# Patient Record
Sex: FEMALE | Race: WHITE | Employment: OTHER | URBAN - METROPOLITAN AREA
[De-identification: names, ages, dates, MRNs, and addresses within clinical notes are randomized per-mention and may not be internally consistent; named-entity substitution may affect disease eponyms.]

---

## 2017-03-10 ENCOUNTER — OFFICE VISIT (OUTPATIENT)
Dept: FAMILY MEDICINE | Facility: CLINIC | Age: 57
End: 2017-03-10
Payer: COMMERCIAL

## 2017-03-10 VITALS
HEART RATE: 78 BPM | SYSTOLIC BLOOD PRESSURE: 118 MMHG | OXYGEN SATURATION: 100 % | TEMPERATURE: 97.5 F | HEIGHT: 64 IN | DIASTOLIC BLOOD PRESSURE: 86 MMHG | BODY MASS INDEX: 22.62 KG/M2 | WEIGHT: 132.5 LBS

## 2017-03-10 DIAGNOSIS — R42 DIZZINESS: Primary | ICD-10-CM

## 2017-03-10 DIAGNOSIS — R53.83 OTHER FATIGUE: ICD-10-CM

## 2017-03-10 DIAGNOSIS — C91.00: ICD-10-CM

## 2017-03-10 DIAGNOSIS — Z87.828 HISTORY OF BICYCLE ACCIDENT: ICD-10-CM

## 2017-03-10 DIAGNOSIS — Z94.81 S/P AUTOLOGOUS BONE MARROW TRANSPLANTATION (H): ICD-10-CM

## 2017-03-10 DIAGNOSIS — G47.00 INSOMNIA, UNSPECIFIED TYPE: ICD-10-CM

## 2017-03-10 LAB
ALBUMIN SERPL-MCNC: 4.3 G/DL (ref 3.4–5)
ALBUMIN UR-MCNC: NEGATIVE MG/DL
ALP SERPL-CCNC: 55 U/L (ref 40–150)
ALT SERPL W P-5'-P-CCNC: 29 U/L (ref 0–50)
ANION GAP SERPL CALCULATED.3IONS-SCNC: 4 MMOL/L (ref 3–14)
APPEARANCE UR: CLEAR
AST SERPL W P-5'-P-CCNC: 21 U/L (ref 0–45)
BASOPHILS # BLD AUTO: 0 10E9/L (ref 0–0.2)
BASOPHILS NFR BLD AUTO: 0.3 %
BILIRUB SERPL-MCNC: 0.3 MG/DL (ref 0.2–1.3)
BILIRUB UR QL STRIP: NEGATIVE
BUN SERPL-MCNC: 11 MG/DL (ref 7–30)
CALCIUM SERPL-MCNC: 9.2 MG/DL (ref 8.5–10.1)
CHLORIDE SERPL-SCNC: 104 MMOL/L (ref 94–109)
CO2 SERPL-SCNC: 31 MMOL/L (ref 20–32)
COLOR UR AUTO: YELLOW
CREAT SERPL-MCNC: 0.76 MG/DL (ref 0.52–1.04)
DIFFERENTIAL METHOD BLD: NORMAL
EOSINOPHIL # BLD AUTO: 0.1 10E9/L (ref 0–0.7)
EOSINOPHIL NFR BLD AUTO: 0.9 %
ERYTHROCYTE [DISTWIDTH] IN BLOOD BY AUTOMATED COUNT: 12.7 % (ref 10–15)
GFR SERPL CREATININE-BSD FRML MDRD: 78 ML/MIN/1.7M2
GLUCOSE SERPL-MCNC: 107 MG/DL (ref 70–99)
GLUCOSE UR STRIP-MCNC: NEGATIVE MG/DL
HCT VFR BLD AUTO: 41.3 % (ref 35–47)
HGB BLD-MCNC: 14.4 G/DL (ref 11.7–15.7)
HGB UR QL STRIP: NEGATIVE
KETONES UR STRIP-MCNC: NEGATIVE MG/DL
LDH SERPL L TO P-CCNC: 204 U/L (ref 81–234)
LEUKOCYTE ESTERASE UR QL STRIP: NEGATIVE
LYMPHOCYTES # BLD AUTO: 1.1 10E9/L (ref 0.8–5.3)
LYMPHOCYTES NFR BLD AUTO: 16 %
MCH RBC QN AUTO: 32.2 PG (ref 26.5–33)
MCHC RBC AUTO-ENTMCNC: 34.9 G/DL (ref 31.5–36.5)
MCV RBC AUTO: 92 FL (ref 78–100)
MONOCYTES # BLD AUTO: 0.7 10E9/L (ref 0–1.3)
MONOCYTES NFR BLD AUTO: 9.7 %
NEUTROPHILS # BLD AUTO: 5.1 10E9/L (ref 1.6–8.3)
NEUTROPHILS NFR BLD AUTO: 73.1 %
NITRATE UR QL: NEGATIVE
PH UR STRIP: 7 PH (ref 5–7)
PLATELET # BLD AUTO: 158 10E9/L (ref 150–450)
POTASSIUM SERPL-SCNC: 4.7 MMOL/L (ref 3.4–5.3)
PROT SERPL-MCNC: 6.4 G/DL (ref 6.8–8.8)
RBC # BLD AUTO: 4.47 10E12/L (ref 3.8–5.2)
SODIUM SERPL-SCNC: 139 MMOL/L (ref 133–144)
SP GR UR STRIP: 1.01 (ref 1–1.03)
TSH SERPL DL<=0.005 MIU/L-ACNC: 0.64 MU/L (ref 0.4–4)
URN SPEC COLLECT METH UR: NORMAL
UROBILINOGEN UR STRIP-ACNC: 0.2 EU/DL (ref 0.2–1)
WBC # BLD AUTO: 6.9 10E9/L (ref 4–11)

## 2017-03-10 PROCEDURE — 80050 GENERAL HEALTH PANEL: CPT | Performed by: FAMILY MEDICINE

## 2017-03-10 PROCEDURE — 83615 LACTATE (LD) (LDH) ENZYME: CPT | Performed by: FAMILY MEDICINE

## 2017-03-10 PROCEDURE — 99214 OFFICE O/P EST MOD 30 MIN: CPT | Performed by: FAMILY MEDICINE

## 2017-03-10 PROCEDURE — 81003 URINALYSIS AUTO W/O SCOPE: CPT | Performed by: FAMILY MEDICINE

## 2017-03-10 PROCEDURE — 36415 COLL VENOUS BLD VENIPUNCTURE: CPT | Performed by: FAMILY MEDICINE

## 2017-03-10 RX ORDER — CYCLOBENZAPRINE HCL 10 MG
10 TABLET ORAL
Qty: 30 TABLET | Refills: 5 | Status: SHIPPED | OUTPATIENT
Start: 2017-03-10 | End: 2018-04-12

## 2017-03-10 RX ORDER — TRAZODONE HYDROCHLORIDE 50 MG/1
50 TABLET, FILM COATED ORAL
Qty: 30 TABLET | Refills: 1 | Status: SHIPPED | OUTPATIENT
Start: 2017-03-10 | End: 2017-06-08

## 2017-03-10 RX ORDER — FLUTICASONE PROPIONATE 50 MCG
1-2 SPRAY, SUSPENSION (ML) NASAL DAILY
Qty: 1 BOTTLE | Refills: 11 | Status: SHIPPED | OUTPATIENT
Start: 2017-03-10 | End: 2017-06-08

## 2017-03-10 ASSESSMENT — PAIN SCALES - GENERAL: PAINLEVEL: NO PAIN (0)
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
## 2017-03-10 NOTE — PATIENT INSTRUCTIONS
Start Flonase.     If dizziness persists or headaches worsen, follow up.     Follow up with oncology.     Take 1 tablet (50mg) of Trazodone nightly as needed for sleep.

## 2017-03-10 NOTE — NURSING NOTE
"Chief Complaint   Patient presents with     Fatigue     Dizziness     very short episodes       Initial /86 (BP Location: Right arm, Patient Position: Chair, Cuff Size: Adult Regular)  Pulse 78  Temp 97.5  F (36.4  C) (Oral)  Ht 1.638 m (5' 4.49\")  Wt 60.1 kg (132 lb 8 oz)  SpO2 100%  Breastfeeding? No  BMI 22.4 kg/m2 Estimated body mass index is 22.4 kg/(m^2) as calculated from the following:    Height as of this encounter: 1.638 m (5' 4.49\").    Weight as of this encounter: 60.1 kg (132 lb 8 oz).  Medication Reconciliation: complete     ALISSA Martinez MA      "

## 2017-06-08 ENCOUNTER — ONCOLOGY VISIT (OUTPATIENT)
Dept: ONCOLOGY | Facility: CLINIC | Age: 57
End: 2017-06-08
Attending: INTERNAL MEDICINE
Payer: COMMERCIAL

## 2017-06-08 ENCOUNTER — APPOINTMENT (OUTPATIENT)
Dept: LAB | Facility: CLINIC | Age: 57
End: 2017-06-08
Attending: INTERNAL MEDICINE
Payer: COMMERCIAL

## 2017-06-08 VITALS
OXYGEN SATURATION: 99 % | SYSTOLIC BLOOD PRESSURE: 125 MMHG | BODY MASS INDEX: 22.06 KG/M2 | TEMPERATURE: 96.6 F | WEIGHT: 130.51 LBS | RESPIRATION RATE: 18 BRPM | DIASTOLIC BLOOD PRESSURE: 84 MMHG | HEART RATE: 77 BPM

## 2017-06-08 DIAGNOSIS — C85.80 LYMPHOMA MALIGNANT, LARGE CELL (H): ICD-10-CM

## 2017-06-08 LAB
BASOPHILS # BLD AUTO: 0 10E9/L (ref 0–0.2)
BASOPHILS NFR BLD AUTO: 0.4 %
DIFFERENTIAL METHOD BLD: NORMAL
EOSINOPHIL # BLD AUTO: 0.1 10E9/L (ref 0–0.7)
EOSINOPHIL NFR BLD AUTO: 1.2 %
ERYTHROCYTE [DISTWIDTH] IN BLOOD BY AUTOMATED COUNT: 12.5 % (ref 10–15)
HCT VFR BLD AUTO: 40.7 % (ref 35–47)
HGB BLD-MCNC: 14.1 G/DL (ref 11.7–15.7)
IMM GRANULOCYTES # BLD: 0 10E9/L (ref 0–0.4)
IMM GRANULOCYTES NFR BLD: 0.2 %
LYMPHOCYTES # BLD AUTO: 0.9 10E9/L (ref 0.8–5.3)
LYMPHOCYTES NFR BLD AUTO: 18.6 %
MCH RBC QN AUTO: 32.6 PG (ref 26.5–33)
MCHC RBC AUTO-ENTMCNC: 34.6 G/DL (ref 31.5–36.5)
MCV RBC AUTO: 94 FL (ref 78–100)
MONOCYTES # BLD AUTO: 0.5 10E9/L (ref 0–1.3)
MONOCYTES NFR BLD AUTO: 10.8 %
NEUTROPHILS # BLD AUTO: 3.4 10E9/L (ref 1.6–8.3)
NEUTROPHILS NFR BLD AUTO: 68.8 %
NRBC # BLD AUTO: 0 10*3/UL
NRBC BLD AUTO-RTO: 0 /100
PLATELET # BLD AUTO: 165 10E9/L (ref 150–450)
RBC # BLD AUTO: 4.32 10E12/L (ref 3.8–5.2)
WBC # BLD AUTO: 4.9 10E9/L (ref 4–11)

## 2017-06-08 PROCEDURE — 85025 COMPLETE CBC W/AUTO DIFF WBC: CPT | Performed by: INTERNAL MEDICINE

## 2017-06-08 PROCEDURE — 99214 OFFICE O/P EST MOD 30 MIN: CPT | Mod: ZP | Performed by: INTERNAL MEDICINE

## 2017-06-08 PROCEDURE — 36415 COLL VENOUS BLD VENIPUNCTURE: CPT | Performed by: INTERNAL MEDICINE

## 2017-06-08 PROCEDURE — 99212 OFFICE O/P EST SF 10 MIN: CPT | Mod: ZF

## 2017-06-08 NOTE — MR AVS SNAPSHOT
After Visit Summary   6/8/2017    Nancy Catalan    MRN: [id number]           Patient Information     Date Of Birth          1960        Visit Information        Provider Department      6/8/2017 9:15 AM Yesenia Horvath MD [facility]        Today's Diagnoses     Lymphoma malignant, large cell (H)           Follow-ups after your visit        Who to contact     If you have questions or need follow up information about today's clinic visit or your schedule please contact [facility] directly at [phone].  Normal or non-critical lab and imaging results will be communicated to you by [employer], letter or phone within 4 business days after the clinic has received the results. If you do not hear from us within 7 days, please contact the clinic through [employer] or phone. If you have a critical or abnormal lab result, we will notify you by phone as soon as possible.  Submit refill requests through [employer] or call your pharmacy and they will forward the refill request to us. Please allow 3 business days for your refill to be completed.          Additional Information About Your Visit        MyChart Information     [employer] gives you secure access to your electronic health record. If you see a primary care provider, you can also send messages to your care team and make appointments. If you have questions, please call your primary care clinic.  If you do not have a primary care provider, please call [phone] and they will assist you.        Care EveryWhere ID     This is your Care EveryWhere ID. This could be used by other organizations to access your [facility] medical records  [id number]        Your Vitals Were     Pulse Temperature Respirations Pulse Oximetry BMI (Body Mass Index)       77 96.6  F (35.9  C) (Oral) 18 99% 22.06 kg/m2        Blood Pressure from Last 3 Encounters:   06/08/17 125/84   03/10/17 118/86   12/29/16 116/75    Weight from Last 3 Encounters:    06/08/17 59.2 kg (130 lb 8.2 oz)   03/10/17 60.1 kg (132 lb 8 oz)   12/29/16 59.8 kg (131 lb 14.4 oz)              We Performed the Following     *CBC with platelets differential        Primary Care Provider    Physician No Ref-Primary       No address on file        Thank you!     Thank you for choosing [facility]  for your care. Our goal is always to provide you with excellent care. Hearing back from our patients is one way we can continue to improve our services. Please take a few minutes to complete the written survey that you may receive in the mail after your visit with us. Thank you!             Your Updated Medication List - Protect others around you: Learn how to safely use, store and throw away your medicines at www.disposemymeds.org.          This list is accurate as of: 6/8/17 10:29 AM.  Always use your most recent med list.                   Brand Name Dispense Instructions for use    acetaminophen 325 MG tablet    TYLENOL    100 tablet    Take 2 tablets (650 mg) by mouth every 4 hours as needed for mild pain or fever       acyclovir 400 MG tablet    ZOVIRAX    180 tablet    Take 1 tablet (400 mg) by mouth 2 times daily       ADVIL 200 MG tablet   Generic drug:  ibuprofen      Take by mouth daily as needed       cholecalciferol 1000 UNITS capsule    vitamin  -D    30 capsule    Take 1 capsule (1,000 Units) by mouth daily       cyclobenzaprine 10 MG tablet    FLEXERIL    30 tablet    Take 1 tablet (10 mg) by mouth nightly as needed for muscle spasms       Multi-vitamin Tabs tablet      Take 1 tablet by mouth daily       OMEGA-3 FISH OIL PO
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
## 2017-06-08 NOTE — NURSING NOTE
Chief Complaint   Patient presents with     Lab Only     peripheral blood draw and vitals   labs drawn from right arm
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
## 2017-06-08 NOTE — NURSING NOTE
"Oncology Rooming Note    June 8, 2017 9:30 AM   Nancy Catalan is a 56 year old female who presents for:    Chief Complaint   Patient presents with     Lab Only     peripheral blood draw and vitals     Oncology Clinic Visit     lymphoma      Initial Vitals: /84  Pulse 77  Temp 96.6  F (35.9  C) (Oral)  Resp 18  Wt 59.2 kg (130 lb 8.2 oz)  SpO2 99%  BMI 22.06 kg/m2 Estimated body mass index is 22.06 kg/(m^2) as calculated from the following:    Height as of 3/10/17: 1.638 m (5' 4.49\").    Weight as of this encounter: 59.2 kg (130 lb 8.2 oz). Body surface area is 1.64 meters squared.  Data Unavailable Comment: Data Unavailable   No LMP recorded. Patient has had an ablation.  Allergies reviewed: Yes  Medications reviewed: Yes    Medications: Medication refills not needed today.  Pharmacy name entered into [employer]:    [id number] [place], [place] - [address] [place] [address] - [phone] [facility] [phone]    Clinical concerns: no doc was NOT notified.    5 minutes for nursing intake (face to face time)     Helen Bennett CMA                "

## 2017-11-16 ENCOUNTER — RADIANT APPOINTMENT (OUTPATIENT)
Dept: MAMMOGRAPHY | Facility: CLINIC | Age: 57
End: 2017-11-16
Attending: FAMILY MEDICINE
Payer: COMMERCIAL

## 2017-11-16 DIAGNOSIS — Z12.39 SCREENING BREAST EXAMINATION: ICD-10-CM

## 2017-11-16 PROCEDURE — 77063 BREAST TOMOSYNTHESIS BI: CPT | Performed by: STUDENT IN AN ORGANIZED HEALTH CARE EDUCATION/TRAINING PROGRAM

## 2017-11-16 PROCEDURE — G0202 SCR MAMMO BI INCL CAD: HCPCS | Performed by: STUDENT IN AN ORGANIZED HEALTH CARE EDUCATION/TRAINING PROGRAM

## 2017-12-07 ENCOUNTER — ONCOLOGY VISIT (OUTPATIENT)
Dept: ONCOLOGY | Facility: CLINIC | Age: 57
End: 2017-12-07
Attending: INTERNAL MEDICINE
Payer: COMMERCIAL

## 2017-12-07 ENCOUNTER — APPOINTMENT (OUTPATIENT)
Dept: LAB | Facility: CLINIC | Age: 57
End: 2017-12-07
Attending: INTERNAL MEDICINE
Payer: COMMERCIAL

## 2017-12-07 VITALS
WEIGHT: 133.5 LBS | TEMPERATURE: 97.4 F | OXYGEN SATURATION: 99 % | DIASTOLIC BLOOD PRESSURE: 79 MMHG | SYSTOLIC BLOOD PRESSURE: 130 MMHG | RESPIRATION RATE: 16 BRPM | HEART RATE: 78 BPM | BODY MASS INDEX: 22.57 KG/M2

## 2017-12-07 DIAGNOSIS — C85.80 LYMPHOMA MALIGNANT, LARGE CELL (H): ICD-10-CM

## 2017-12-07 LAB
BASOPHILS # BLD AUTO: 0 10E9/L (ref 0–0.2)
BASOPHILS NFR BLD AUTO: 0.3 %
DIFFERENTIAL METHOD BLD: ABNORMAL
EOSINOPHIL # BLD AUTO: 0 10E9/L (ref 0–0.7)
EOSINOPHIL NFR BLD AUTO: 0.7 %
ERYTHROCYTE [DISTWIDTH] IN BLOOD BY AUTOMATED COUNT: 12.3 % (ref 10–15)
HCT VFR BLD AUTO: 41.3 % (ref 35–47)
HGB BLD-MCNC: 13.8 G/DL (ref 11.7–15.7)
IMM GRANULOCYTES # BLD: 0 10E9/L (ref 0–0.4)
IMM GRANULOCYTES NFR BLD: 0.3 %
LYMPHOCYTES # BLD AUTO: 1.1 10E9/L (ref 0.8–5.3)
LYMPHOCYTES NFR BLD AUTO: 35.8 %
MCH RBC QN AUTO: 31.2 PG (ref 26.5–33)
MCHC RBC AUTO-ENTMCNC: 33.4 G/DL (ref 31.5–36.5)
MCV RBC AUTO: 93 FL (ref 78–100)
MONOCYTES # BLD AUTO: 0.4 10E9/L (ref 0–1.3)
MONOCYTES NFR BLD AUTO: 13 %
NEUTROPHILS # BLD AUTO: 1.5 10E9/L (ref 1.6–8.3)
NEUTROPHILS NFR BLD AUTO: 49.9 %
NRBC # BLD AUTO: 0 10*3/UL
NRBC BLD AUTO-RTO: 0 /100
PLATELET # BLD AUTO: 145 10E9/L (ref 150–450)
RBC # BLD AUTO: 4.43 10E12/L (ref 3.8–5.2)
WBC # BLD AUTO: 2.9 10E9/L (ref 4–11)

## 2017-12-07 PROCEDURE — 36415 COLL VENOUS BLD VENIPUNCTURE: CPT

## 2017-12-07 PROCEDURE — 85025 COMPLETE CBC W/AUTO DIFF WBC: CPT | Performed by: INTERNAL MEDICINE

## 2017-12-07 PROCEDURE — 99212 OFFICE O/P EST SF 10 MIN: CPT | Mod: ZF

## 2017-12-07 PROCEDURE — 99213 OFFICE O/P EST LOW 20 MIN: CPT | Mod: ZP | Performed by: INTERNAL MEDICINE

## 2017-12-07 ASSESSMENT — PAIN SCALES - GENERAL: PAINLEVEL: NO PAIN (0)
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
## 2017-12-07 NOTE — NURSING NOTE
"Oncology Rooming Note    December 7, 2017 9:13 AM   Nancy Catalan is a 57 year old female who presents for:    Chief Complaint   Patient presents with     Blood Draw     Oncology Clinic Visit     Return for Lymphoma , labs      Initial Vitals: /79  Pulse 78  Temp 97.4  F (36.3  C) (Oral)  Resp 16  Wt 60.6 kg (133 lb 8 oz)  SpO2 99%  BMI 22.57 kg/m2 Estimated body mass index is 22.57 kg/(m^2) as calculated from the following:    Height as of 3/10/17: 1.638 m (5' 4.49\").    Weight as of this encounter: 60.6 kg (133 lb 8 oz). Body surface area is 1.66 meters squared.  No Pain (0) Comment: Data Unavailable   No LMP recorded. Patient has had an ablation.  Allergies reviewed: Yes  Medications reviewed: Yes    Medications: Medication refills not needed today.  Pharmacy name entered into [employer]:    [id number] [place], [place] - [address] - [address] [facility] [phone]    Clinical concerns: results  [facility]  was notified.    7 minutes for nursing intake (face to face time)     Bella Davis MA              "
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
## 2017-12-07 NOTE — NURSING NOTE
Labs completed via  and vitals obtained without complication.    See flowsheets.    Patient was checked into next appt.    June Ray CMA (AAMA)
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
## 2017-12-24 ENCOUNTER — HEALTH MAINTENANCE LETTER (OUTPATIENT)
Age: 57
End: 2017-12-24

## 2018-02-01 ENCOUNTER — OFFICE VISIT (OUTPATIENT)
Dept: DERMATOLOGY | Facility: CLINIC | Age: 58
End: 2018-02-01
Payer: MEDICAID

## 2018-02-01 DIAGNOSIS — Z12.83 SKIN CANCER SCREENING: Primary | ICD-10-CM

## 2018-02-01 DIAGNOSIS — D18.01 CHERRY ANGIOMA: ICD-10-CM

## 2018-02-01 DIAGNOSIS — L82.1 SEBORRHEIC KERATOSIS: ICD-10-CM

## 2018-02-01 DIAGNOSIS — Z80.8 FAMILY HISTORY OF MELANOMA: ICD-10-CM

## 2018-02-01 DIAGNOSIS — Z85.820 HISTORY OF MELANOMA: ICD-10-CM

## 2018-02-01 ASSESSMENT — PAIN SCALES - GENERAL: PAINLEVEL: NO PAIN (0)
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
## 2018-02-01 NOTE — PATIENT INSTRUCTIONS
The ABCDEs of Melanoma    Skin cancer can develop anywhere on the skin. Ask someone for help when checking your skin, especially in hard to see places. If you notice a mole different from others, or that changes, enlarges, itches, or bleeds (even if it is small), you should see a dermatologist.
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
## 2018-02-01 NOTE — NURSING NOTE
Chief Complaint   Patient presents with     Skin Check     Nancy is here today to have a full body exam. She is post 3 month recheck after having a Melanoma on the right shoulder. She had this excised at [facility].      Shahana Philippe CMA

## 2018-04-12 ENCOUNTER — TELEPHONE (OUTPATIENT)
Dept: FAMILY MEDICINE | Facility: CLINIC | Age: 58
End: 2018-04-12

## 2018-04-12 DIAGNOSIS — Z87.828 HISTORY OF BICYCLE ACCIDENT: ICD-10-CM

## 2018-04-12 NOTE — TELEPHONE ENCOUNTER
Reason for Call:  Medication or medication refill:    Do you use a [facility]?  Name of the pharmacy and phone number for the current request:  [facility] [address] [place] [address] [address] AT [facility] [address]    Name of the medication requested: cyclobenzaprine (FLEXERIL) 10 MG tablet    Other request:     Can we leave a detailed message on this number? YES    Phone number patient can be reached at: Cell number on file:    Telephone Information:   Mobile [phone]       Best Time: anytime     Call taken on 4/12/2018 at 4:02 PM by Bernie Parrish

## 2018-04-17 RX ORDER — CYCLOBENZAPRINE HCL 10 MG
10 TABLET ORAL
Qty: 30 TABLET | Refills: 0 | Status: SHIPPED | OUTPATIENT
Start: 2018-04-17 | End: 2018-07-27

## 2018-04-17 NOTE — TELEPHONE ENCOUNTER
flexeril      Last Written Prescription Date:  3/10/17  Last Fill Quantity: 30,   # refills: 5  Last Office Visit: 3/10/17  Future Office visit:       Routing refill request to provider for review/approval because:  Drug not on the [facility], [facility] or [facility] refill protocol or controlled substance

## 2018-05-02 ENCOUNTER — OFFICE VISIT (OUTPATIENT)
Dept: DERMATOLOGY | Facility: CLINIC | Age: 58
End: 2018-05-02
Payer: COMMERCIAL

## 2018-05-02 DIAGNOSIS — L81.4 SOLAR LENTIGINOSIS: ICD-10-CM

## 2018-05-02 DIAGNOSIS — L82.1 SEBORRHEIC KERATOSIS: ICD-10-CM

## 2018-05-02 DIAGNOSIS — Z12.83 SKIN CANCER SCREENING: ICD-10-CM

## 2018-05-02 DIAGNOSIS — Z80.8 FAMILY HISTORY OF MELANOMA: ICD-10-CM

## 2018-05-02 DIAGNOSIS — Z80.9 FAMILY HISTORY OF MALIGNANT NEOPLASM: Primary | ICD-10-CM

## 2018-05-02 ASSESSMENT — PAIN SCALES - GENERAL: PAINLEVEL: NO PAIN (0)
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
## 2018-05-02 NOTE — NURSING NOTE
Chief Complaint   Patient presents with     Derm Problem     Nancy is here today for a total body skin exam. Patient has no concerns.     Michelle Linares LPN

## 2018-05-22 ENCOUNTER — OFFICE VISIT (OUTPATIENT)
Dept: FAMILY MEDICINE | Facility: CLINIC | Age: 58
End: 2018-05-22
Payer: COMMERCIAL

## 2018-05-22 VITALS
DIASTOLIC BLOOD PRESSURE: 72 MMHG | SYSTOLIC BLOOD PRESSURE: 114 MMHG | HEART RATE: 86 BPM | WEIGHT: 134 LBS | BODY MASS INDEX: 22.88 KG/M2 | TEMPERATURE: 98.6 F | RESPIRATION RATE: 17 BRPM | HEIGHT: 64 IN | OXYGEN SATURATION: 100 %

## 2018-05-22 DIAGNOSIS — H66.90 ACUTE OTITIS MEDIA, UNSPECIFIED OTITIS MEDIA TYPE: Primary | ICD-10-CM

## 2018-05-22 PROCEDURE — 99213 OFFICE O/P EST LOW 20 MIN: CPT | Performed by: PHYSICIAN ASSISTANT

## 2018-05-22 ASSESSMENT — PAIN SCALES - GENERAL: PAINLEVEL: MILD PAIN (3)
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
## 2018-05-22 NOTE — PATIENT INSTRUCTIONS
Take augmentin twice a day for 10 days  Follow up with ENT if no improvement in symptoms   Return urgently if any change in symptoms like fever, increasing cough, shortness of breath, or other change in symptoms.     At [facility], we strive to deliver an exceptional experience to you, every time we see you.  If you receive a survey in the mail, please send us back your thoughts. We really do value your feedback.      Suggested websites for health information:  Www.[facility].org : Up to date and easily searchable information on multiple topics.  Www.medlineplus.gov : medication info, interactive tutorials, watch real surgeries online  Www.familydoctor.org : good info from the Academy of Family Physicians  Www.cdc.gov : public health info, travel advisories, epidemics (H1N1)  Www.aap.org : children's health info, normal development, vaccinations  Www.health.state.[place].us : [place] dept of health, public health issues in [place], N1N1    Your care team:     Family Medicine   PURVI Fonseca MD Emily Bunt, APRN [facility]   S. MD Cintia Clark MD Angela Wermerskirchen, MD         Clinic hours: Monday - Wednesday 7 am-7 pm   Thursdays and Fridays 7 am-5 pm.     [facility]: Monday - Friday 11 am-9 pm,   Saturday and Sunday 9 am-5 pm.    [facility]: Monday -Thursday 8 am-8 pm; Friday 8 am-6 pm; Saturday and Sunday 9 am-5 pm.     [facility]: Monday - Thursday 8 am - 7 pm; Friday 8 am - 6 pm    Clinic: [phone]   [facility]: [phone]   [facility]: [phone]

## 2018-06-07 ENCOUNTER — ONCOLOGY VISIT (OUTPATIENT)
Dept: ONCOLOGY | Facility: CLINIC | Age: 58
End: 2018-06-07
Attending: INTERNAL MEDICINE
Payer: COMMERCIAL

## 2018-06-07 ENCOUNTER — APPOINTMENT (OUTPATIENT)
Dept: LAB | Facility: CLINIC | Age: 58
End: 2018-06-07
Attending: INTERNAL MEDICINE
Payer: COMMERCIAL

## 2018-06-07 VITALS
OXYGEN SATURATION: 98 % | HEART RATE: 79 BPM | SYSTOLIC BLOOD PRESSURE: 139 MMHG | BODY MASS INDEX: 22.81 KG/M2 | RESPIRATION RATE: 16 BRPM | WEIGHT: 133.6 LBS | DIASTOLIC BLOOD PRESSURE: 83 MMHG | TEMPERATURE: 98 F | HEIGHT: 64 IN

## 2018-06-07 DIAGNOSIS — C85.80 LYMPHOMA MALIGNANT, LARGE CELL (H): ICD-10-CM

## 2018-06-07 DIAGNOSIS — R25.2 LEG CRAMPS: Primary | ICD-10-CM

## 2018-06-07 LAB
ALBUMIN SERPL-MCNC: 4 G/DL (ref 3.4–5)
ALP SERPL-CCNC: 77 U/L (ref 40–150)
ALT SERPL W P-5'-P-CCNC: 25 U/L (ref 0–50)
ANION GAP SERPL CALCULATED.3IONS-SCNC: 9 MMOL/L (ref 3–14)
AST SERPL W P-5'-P-CCNC: 26 U/L (ref 0–45)
BASOPHILS # BLD AUTO: 0 10E9/L (ref 0–0.2)
BASOPHILS NFR BLD AUTO: 0.9 %
BILIRUB SERPL-MCNC: 0.5 MG/DL (ref 0.2–1.3)
BUN SERPL-MCNC: 16 MG/DL (ref 7–30)
CALCIUM SERPL-MCNC: 8.4 MG/DL (ref 8.5–10.1)
CHLORIDE SERPL-SCNC: 103 MMOL/L (ref 94–109)
CO2 SERPL-SCNC: 25 MMOL/L (ref 20–32)
CREAT SERPL-MCNC: 0.67 MG/DL (ref 0.52–1.04)
DIFFERENTIAL METHOD BLD: ABNORMAL
EOSINOPHIL # BLD AUTO: 0.1 10E9/L (ref 0–0.7)
EOSINOPHIL NFR BLD AUTO: 2.9 %
ERYTHROCYTE [DISTWIDTH] IN BLOOD BY AUTOMATED COUNT: 12.3 % (ref 10–15)
FERRITIN SERPL-MCNC: 104 NG/ML (ref 8–252)
GFR SERPL CREATININE-BSD FRML MDRD: >90 ML/MIN/1.7M2
GLUCOSE SERPL-MCNC: 100 MG/DL (ref 70–99)
HCT VFR BLD AUTO: 41.4 % (ref 35–47)
HGB BLD-MCNC: 14.1 G/DL (ref 11.7–15.7)
IMM GRANULOCYTES # BLD: 0 10E9/L (ref 0–0.4)
IMM GRANULOCYTES NFR BLD: 0.3 %
LYMPHOCYTES # BLD AUTO: 0.9 10E9/L (ref 0.8–5.3)
LYMPHOCYTES NFR BLD AUTO: 25.8 %
MAGNESIUM SERPL-MCNC: 2.1 MG/DL (ref 1.6–2.3)
MCH RBC QN AUTO: 31.8 PG (ref 26.5–33)
MCHC RBC AUTO-ENTMCNC: 34.1 G/DL (ref 31.5–36.5)
MCV RBC AUTO: 93 FL (ref 78–100)
MONOCYTES # BLD AUTO: 0.4 10E9/L (ref 0–1.3)
MONOCYTES NFR BLD AUTO: 11.2 %
NEUTROPHILS # BLD AUTO: 2.1 10E9/L (ref 1.6–8.3)
NEUTROPHILS NFR BLD AUTO: 58.9 %
NRBC # BLD AUTO: 0 10*3/UL
NRBC BLD AUTO-RTO: 0 /100
PHOSPHATE SERPL-MCNC: 3 MG/DL (ref 2.5–4.5)
PLATELET # BLD AUTO: 165 10E9/L (ref 150–450)
POTASSIUM SERPL-SCNC: 3.8 MMOL/L (ref 3.4–5.3)
PROT SERPL-MCNC: 6.4 G/DL (ref 6.8–8.8)
RBC # BLD AUTO: 4.44 10E12/L (ref 3.8–5.2)
SODIUM SERPL-SCNC: 137 MMOL/L (ref 133–144)
TSH SERPL DL<=0.005 MIU/L-ACNC: 1.1 MU/L (ref 0.4–4)
WBC # BLD AUTO: 3.5 10E9/L (ref 4–11)

## 2018-06-07 PROCEDURE — G0463 HOSPITAL OUTPT CLINIC VISIT: HCPCS | Mod: ZF

## 2018-06-07 PROCEDURE — 99215 OFFICE O/P EST HI 40 MIN: CPT | Mod: ZP | Performed by: INTERNAL MEDICINE

## 2018-06-07 PROCEDURE — 84443 ASSAY THYROID STIM HORMONE: CPT | Performed by: INTERNAL MEDICINE

## 2018-06-07 PROCEDURE — 85025 COMPLETE CBC W/AUTO DIFF WBC: CPT | Performed by: INTERNAL MEDICINE

## 2018-06-07 PROCEDURE — 80053 COMPREHEN METABOLIC PANEL: CPT | Performed by: INTERNAL MEDICINE

## 2018-06-07 PROCEDURE — 84100 ASSAY OF PHOSPHORUS: CPT | Performed by: INTERNAL MEDICINE

## 2018-06-07 PROCEDURE — 83735 ASSAY OF MAGNESIUM: CPT | Performed by: INTERNAL MEDICINE

## 2018-06-07 PROCEDURE — 82728 ASSAY OF FERRITIN: CPT | Performed by: INTERNAL MEDICINE

## 2018-06-07 ASSESSMENT — PAIN SCALES - GENERAL: PAINLEVEL: NO PAIN (0)
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
## 2018-06-07 NOTE — NURSING NOTE
Chief Complaint   Patient presents with     Oncology Clinic Visit     Return Lymphoma     Blood Draw     Labs drawn from vpt by RN. Vs taken and pt checked in for appt     Labs collected from venipuncture by RN. Vitals taken. Checked in for appointment(s).    Stephanie Roque RN
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
## 2018-06-07 NOTE — NURSING NOTE
"Oncology Rooming Note    June 7, 2018 9:20 AM   Nancy Catalan is a 57 year old female who presents for:    Chief Complaint   Patient presents with     Oncology Clinic Visit     Return Lymphoma     Blood Draw     Labs drawn from vpt by RN. Vs taken and pt checked in for appt     Initial Vitals: /83 (BP Location: Right arm, Cuff Size: Adult Regular)  Pulse 79  Temp 98  F (36.7  C) (Oral)  Resp 16  Ht 1.613 m (5' 3.5\")  Wt 60.6 kg (133 lb 9.6 oz)  SpO2 98%  BMI 23.29 kg/m2 Estimated body mass index is 23.29 kg/(m^2) as calculated from the following:    Height as of this encounter: 1.613 m (5' 3.5\").    Weight as of this encounter: 60.6 kg (133 lb 9.6 oz). Body surface area is 1.65 meters squared.  No Pain (0) Comment: Data Unavailable   No LMP recorded. Patient has had an ablation.  Allergies reviewed: Yes  Medications reviewed: Yes    Medications: Medication refills not needed today.  Pharmacy name entered into EPIC:    [facility] [place], [place] - [address] [place] [address] [phone]  [facility] [address] [place] [address] IAN AVALOS AT [facility] [address]    Clinical concerns: patient had a cold and still has some residual ear pain and a cough; shingles vaccine; leg cramping;      6 minutes for nursing intake (face to face time)     Sunshine Chaves CMA              "

## 2018-07-27 DIAGNOSIS — Z87.828 HISTORY OF BICYCLE ACCIDENT: ICD-10-CM

## 2018-07-27 RX ORDER — CYCLOBENZAPRINE HCL 10 MG
TABLET ORAL
Qty: 30 TABLET | Refills: 0 | Status: SHIPPED | OUTPATIENT
Start: 2018-07-27 | End: 2018-10-17

## 2018-07-27 NOTE — TELEPHONE ENCOUNTER
Routing refill request to provider for review/approval because:  Drug not on the [facility] refill protocol   Vikki Fuentes RN

## 2018-07-27 NOTE — TELEPHONE ENCOUNTER
Requested Prescriptions   Pending Prescriptions Disp Refills     cyclobenzaprine (FLEXERIL) 10 MG tablet [Pharmacy Med Name: CYCLOBENZAPRINE 10MG TABLETS]      Last Written Prescription Date:  4/17/18  Last Fill Quantity: 30 tablet,   # refills: 0  Last Office Visit: 5/22/18 Stephanie MILLER  Future Office visit:    Next 5 appointments (look out 90 days)     Aug 27, 2018  2:00 PM CDT   PHYSICAL with Federica Ashraf MD   [facility] ([address] [id number] [phone]                   Routing refill request to provider for review/approval because:  Drug not on the [facility], [facility] or  Health refill protocol or controlled substance 30 tablet 0     Sig: TAKE 1 TABLET BY MOUTH EVERY EVENING AS NEEDED FOR MUSCLE SPASMS    There is no refill protocol information for this order

## 2018-08-27 ENCOUNTER — OFFICE VISIT (OUTPATIENT)
Dept: FAMILY MEDICINE | Facility: CLINIC | Age: 58
End: 2018-08-27
Payer: COMMERCIAL

## 2018-08-27 VITALS
SYSTOLIC BLOOD PRESSURE: 126 MMHG | DIASTOLIC BLOOD PRESSURE: 81 MMHG | TEMPERATURE: 98.4 F | OXYGEN SATURATION: 99 % | RESPIRATION RATE: 16 BRPM | HEIGHT: 64 IN | BODY MASS INDEX: 22.88 KG/M2 | HEART RATE: 70 BPM | WEIGHT: 134 LBS

## 2018-08-27 DIAGNOSIS — Z00.00 ENCOUNTER FOR ROUTINE ADULT HEALTH EXAMINATION WITHOUT ABNORMAL FINDINGS: Primary | ICD-10-CM

## 2018-08-27 DIAGNOSIS — F41.9 ANXIETY: ICD-10-CM

## 2018-08-27 DIAGNOSIS — Z23 NEED FOR VACCINATION FOR ZOSTER: ICD-10-CM

## 2018-08-27 DIAGNOSIS — Z78.0 MENOPAUSE: ICD-10-CM

## 2018-08-27 DIAGNOSIS — C91.00: ICD-10-CM

## 2018-08-27 DIAGNOSIS — D80.1 HYPOGAMMAGLOBULINEMIA (H): ICD-10-CM

## 2018-08-27 DIAGNOSIS — C82.99: ICD-10-CM

## 2018-08-27 DIAGNOSIS — C83.70 BURKITT LYMPHOMA, UNSPECIFIED BODY REGION (H): ICD-10-CM

## 2018-08-27 PROCEDURE — 87624 HPV HI-RISK TYP POOLED RSLT: CPT | Performed by: FAMILY MEDICINE

## 2018-08-27 PROCEDURE — 90750 HZV VACC RECOMBINANT IM: CPT | Performed by: FAMILY MEDICINE

## 2018-08-27 PROCEDURE — G0145 SCR C/V CYTO,THINLAYER,RESCR: HCPCS | Performed by: FAMILY MEDICINE

## 2018-08-27 PROCEDURE — 99396 PREV VISIT EST AGE 40-64: CPT | Mod: 25 | Performed by: FAMILY MEDICINE

## 2018-08-27 PROCEDURE — 90471 IMMUNIZATION ADMIN: CPT | Performed by: FAMILY MEDICINE

## 2018-08-27 ASSESSMENT — PAIN SCALES - GENERAL: PAINLEVEL: NO PAIN (0)

## 2018-08-27 NOTE — PATIENT INSTRUCTIONS
Activity and stretching are helpful to prevent muscle cramps.     Please call [facility] ([facility] at [phone] to schedule a mammogram or bone scan anytime after November 7th.     Second shingles vaccine should be in 2 months.     At [facility], we strive to deliver an exceptional experience to you, every time we see you.  If you receive a survey in the mail, please send us back your thoughts. We really do value your feedback.    Suggested websites for health information:  Www.BangTango.org : Up to date and easily searchable information on multiple topics.  Www.medlineplus.gov : medication info, interactive tutorials, watch real surgeries online  Www.familydoctor.org : good info from the Academy of Family Physicians  Www.cdc.gov : public health info, travel advisories, epidemics (H1N1)  Www.aap.org : children's health info, normal development, vaccinations  Www.health.[facility].[place].us : [place] dept of health, public health issues in [place], N1N1    Your care team:     Family Medicine   PURVI Fonseca MD Emily Bunt, APRN CNP   S. MD Cintia Clark MD Angela Wermerskirchen, MD         Clinic hours: Monday - Wednesday 7 am-7 pm   Thursdays and Fridays 7 am-5 pm.     [place] [facility]: Monday - Friday 11 am-9 pm,   Saturday and Sunday 9 am-5 pm.    [place] [facility]: Monday -Thursday 8 am-8 pm; Friday 8 am-6 pm; Saturday and Sunday 9 am-5 pm.     [facility]: Monday - Thursday 8 am - 7 pm; Friday 8 am - 6 pm    Clinic: [phone]   [facility]: [phone]   [facility]: [phone]            Preventive Health Recommendations  Female Ages 50 - 64    Yearly exam: See your health care provider every year in order to  o Review health changes.   o Discuss preventive care.    o Review your medicines if your doctor has  prescribed any.      Get a Pap test every three years (unless you have an abnormal result and your provider advises testing more often).    If you get Pap tests with HPV test, you only need to test every 5 years, unless you have an abnormal result.     You do not need a Pap test if your uterus was removed (hysterectomy) and you have not had cancer.    You should be tested each year for STDs (sexually transmitted diseases) if you're at risk.     Have a mammogram every 1 to 2 years.    Have a colonoscopy at age 50, or have a yearly FIT test (stool test). These exams screen for colon cancer.      Have a cholesterol test every 5 years, or more often if advised.    Have a diabetes test (fasting glucose) every three years. If you are at risk for diabetes, you should have this test more often.     If you are at risk for osteoporosis (brittle bone disease), think about having a bone density scan (DEXA).    Shots: Get a flu shot each year. Get a tetanus shot every 10 years.    Nutrition:     Eat at least 5 servings of fruits and vegetables each day.    Eat whole-grain bread, whole-wheat pasta and brown rice instead of white grains and rice.    Get adequate Calcium and Vitamin D.     Lifestyle    Exercise at least 150 minutes a week (30 minutes a day, 5 days a week). This will help you control your weight and prevent disease.    Limit alcohol to one drink per day.    No smoking.     Wear sunscreen to prevent skin cancer.     See your dentist every six months for an exam and cleaning.    See your eye doctor every 1 to 2 years.
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
## 2018-08-27 NOTE — PROGRESS NOTES
SUBJECTIVE:   CC: Nancy Catalan is an 58 year old woman who presents for preventive health visit.     Healthy Habits:    Do you get at least three servings of calcium containing foods daily (dairy, green leafy vegetables, etc.)? yes    Amount of exercise or daily activities, outside of work: 6 day(s) per week    Problems taking medications regularly No    Medication side effects: No    Have you had an eye exam in the past two years? no    Do you see a dentist twice per year? yes    Do you have sleep apnea, excessive snoring or daytime drowsiness?no      {additional problems to add (Optional):039132}    Today's PHQ-2 Score:   PHQ-2 ( 1999 Pfizer) 5/22/2018 10/17/2016   Q1: Little interest or pleasure in doing things 0 0   Q2: Feeling down, depressed or hopeless 0 0   PHQ-2 Score 0 0       Abuse: Current or Past(Physical, Sexual or Emotional)- No  Do you feel safe in your environment - Yes    Social History   Substance Use Topics     Smoking status: Never Smoker     Smokeless tobacco: Never Used     Alcohol use 0.5 - 1.0 oz/week     1 - 2 Standard drinks or equivalent per week      Comment: 1-2 drinks 2-3 time per week     If you drink alcohol do you typically have >3 drinks per day or >7 drinks per week? No                     Reviewed orders with patient.  Reviewed health maintenance and updated orders accordingly - Yes  {Chronicprobdata (Optional):104963}    {Mammo Decision Support (Optional):966754}    Pertinent mammograms are reviewed under the imaging tab.  History of abnormal Pap smear: {PAP HX:899600}  PAP / HPV Latest Ref Rng & Units 10/17/2016   PAP - NIL   HPV 16 DNA NEG Negative   HPV 18 DNA NEG Negative   OTHER HR HPV NEG Negative     Reviewed and updated as needed this visit by clinical staff  Tobacco  Allergies  Meds  Med Hx  Surg Hx  Fam Hx  Soc Hx        Reviewed and updated as needed this visit by Provider        {HISTORY OPTIONS (Optional):235008}    ROS:  { :531452}    OBJECTIVE:   BP  "126/81 (BP Location: Right arm, Patient Position: Chair, Cuff Size: Adult Regular)  Pulse 70  Temp 98.4  F (36.9  C) (Oral)  Resp 16  Ht 1.613 m (5' 3.5\")  Wt 60.8 kg (134 lb)  LMP  (Exact Date)  SpO2 99%  Breastfeeding? No  BMI 23.36 kg/m2  EXAM:  {Exam Choices:304274}    {Diagnostic Test Results (Optional):457622::\"Diagnostic Test Results:\",\"none \"}    ASSESSMENT/PLAN:   {Diag Picklist:369329}    COUNSELING:   {FEMALE COUNSELING MESSAGES:959682::\"Reviewed preventive health counseling, as reflected in patient instructions\"}    BP Readings from Last 1 Encounters:   08/27/18 126/81     Estimated body mass index is 23.36 kg/(m^2) as calculated from the following:    Height as of this encounter: 1.613 m (5' 3.5\").    Weight as of this encounter: 60.8 kg (134 lb).    {BP Counseling- Complete if BP >= 120/80  (Optional):632305}  {Weight Management Plan (ACO) Complete if BMI is abnormal-  Ages 18-64  BMI >24.9.  Age 65+ with BMI <23 or >30 (Optional):419199}     reports that she has never smoked. She has never used smokeless tobacco.  {Tobacco Cessation -- Complete if patient is a smoker (Optional):618139}    Counseling Resources:  ATP IV Guidelines  Pooled Cohorts Equation Calculator  Breast Cancer Risk Calculator  FRAX Risk Assessment  ICSI Preventive Guidelines  Dietary Guidelines for Americans, 2010  USDA's MyPlate  ASA Prophylaxis  Lung CA Screening    Federica Ashraf MD  [facility]"

## 2018-08-27 NOTE — NURSING NOTE
Screening Questionnaire for Adult Immunization    Are you sick today?   No   Do you have allergies to medications, food, a vaccine component or latex?   No   Have you ever had a serious reaction after receiving a vaccination?   No   Do you have a long-term health problem with heart disease, lung disease, asthma, kidney disease, metabolic disease (e.g. diabetes), anemia, or other blood disorder?   No   Do you have cancer, leukemia, HIV/AIDS, or any other immune system problem?   No   In the past 3 months, have you taken medications that affect  your immune system, such as prednisone, other steroids, or anticancer drugs; drugs for the treatment of rheumatoid arthritis, Crohn s disease, or psoriasis; or have you had radiation treatments?   No   Have you had a seizure, or a brain or other nervous system problem?   No   During the past year, have you received a transfusion of blood or blood     products, or been given immune (gamma) globulin or antiviral drug?   No   For women: Are you pregnant or is there a chance you could become        pregnant during the next month?   No   Have you received any vaccinations in the past 4 weeks?   No     Immunization questionnaire answers were all negative.        Patient instructed to remain in clinic for 15 minutes afterwards, and to report any adverse reaction to me immediately.       Screening performed by Melissa Cool on 8/27/2018 at 3:25 PM.

## 2018-08-29 LAB
COPATH REPORT: NORMAL
PAP: NORMAL

## 2018-08-30 LAB
FINAL DIAGNOSIS: NORMAL
HPV HR 12 DNA CVX QL NAA+PROBE: NEGATIVE
HPV16 DNA SPEC QL NAA+PROBE: NEGATIVE
HPV18 DNA SPEC QL NAA+PROBE: NEGATIVE
SPECIMEN DESCRIPTION: NORMAL
SPECIMEN SOURCE CVX/VAG CYTO: NORMAL

## 2018-09-25 ENCOUNTER — OFFICE VISIT (OUTPATIENT)
Dept: DERMATOLOGY | Facility: CLINIC | Age: 58
End: 2018-09-25
Payer: COMMERCIAL

## 2018-09-25 DIAGNOSIS — L82.1 SEBORRHEIC KERATOSIS: ICD-10-CM

## 2018-09-25 DIAGNOSIS — D18.01 CHERRY ANGIOMA: ICD-10-CM

## 2018-09-25 DIAGNOSIS — Z12.83 SKIN CANCER SCREENING: ICD-10-CM

## 2018-09-25 DIAGNOSIS — Z80.8 FAMILY HISTORY OF MELANOMA: Primary | ICD-10-CM

## 2018-09-25 DIAGNOSIS — D22.9 MULTIPLE BENIGN NEVI: ICD-10-CM

## 2018-09-25 DIAGNOSIS — L82.0 INFLAMED SEBORRHEIC KERATOSIS: ICD-10-CM

## 2018-09-25 DIAGNOSIS — Z85.820 HISTORY OF MELANOMA: ICD-10-CM

## 2018-09-25 ASSESSMENT — PAIN SCALES - GENERAL
PAINLEVEL: MILD PAIN (2)
PAINLEVEL: NO PAIN (0)
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
## 2018-09-25 NOTE — NURSING NOTE
Dermatology Rooming Note    Nancy Catalan's goals for this visit include:   Chief Complaint   Patient presents with     Skin Check     Skin check, no concerns noted at this time.      Génesis Us LPN

## 2018-09-25 NOTE — PATIENT INSTRUCTIONS
Cryotherapy    What is it?    Use of a very cold liquid, such as liquid nitrogen, to freeze and destroy abnormal skin cells that need to be removed    What should I expect?    Tenderness and redness    A small blister that might grow and fill with dark purple blood. There may be crusting.    More than one treatment may be needed if the lesions do not go away.    How do I care for the treated area?    Gently wash the area with your hands when bathing.    Use a thin layer of Vaseline to help with healing. You may use a Band-Aid.     The area should heal within 7-10 days and may leave behind a pink or lighter color.     Do not use an antibiotic or Neosporin ointment.     You may take acetaminophen (Tylenol) for pain.     Call your Doctor if you have:    Severe pain    Signs of infection (warmth, redness, cloudy yellow drainage, and or a bad smell)    Questions or concerns    Who should I call with questions?       [facility]: [phone]       [facility]: [phone]       For urgent needs outside of business hours call the [facility] at [phone]        and ask for the dermatology resident on call
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
## 2018-10-17 ENCOUNTER — TELEPHONE (OUTPATIENT)
Dept: FAMILY MEDICINE | Facility: CLINIC | Age: 58
End: 2018-10-17

## 2018-10-17 DIAGNOSIS — Z87.828 HISTORY OF BICYCLE ACCIDENT: ICD-10-CM

## 2018-10-17 RX ORDER — CYCLOBENZAPRINE HCL 10 MG
TABLET ORAL
Qty: 30 TABLET | Refills: 0 | Status: SHIPPED | OUTPATIENT
Start: 2018-10-17 | End: 2019-01-09

## 2018-10-17 NOTE — TELEPHONE ENCOUNTER
Requested Prescriptions   Pending Prescriptions Disp Refills     cyclobenzaprine (FLEXERIL) 10 MG tablet [Pharmacy Med Name: CYCLOBENZAPRINE 10MG TABLETS] 30 tablet 0     Sig: TAKE 1 TABLET BY MOUTH EVERY EVENING AS NEEDED FOR MUSCLE SPASMS    There is no refill protocol information for this order          cyclobenzaprine (FLEXERIL) 10 MG tablet      Last Written Prescription Date:  7/27/18  Last Fill Quantity: 30,   # refills: 0  Last Office Visit: 8/27/18  Future Office visit:       Routing refill request to provider for review/approval because:  Drug not on the [facility], [facility] or [facility] refill protocol or controlled substance

## 2018-11-08 ENCOUNTER — ALLIED HEALTH/NURSE VISIT (OUTPATIENT)
Dept: NURSING | Facility: CLINIC | Age: 58
End: 2018-11-08
Payer: COMMERCIAL

## 2018-11-08 DIAGNOSIS — Z23 NEED FOR PROPHYLACTIC VACCINATION AND INOCULATION AGAINST INFLUENZA: Primary | ICD-10-CM

## 2018-11-08 DIAGNOSIS — Z23 NEED FOR SHINGLES VACCINE: ICD-10-CM

## 2018-11-08 PROCEDURE — 90472 IMMUNIZATION ADMIN EACH ADD: CPT

## 2018-11-08 PROCEDURE — 90471 IMMUNIZATION ADMIN: CPT

## 2018-11-08 PROCEDURE — 90750 HZV VACC RECOMBINANT IM: CPT

## 2018-11-08 PROCEDURE — 99207 ZZC NO CHARGE NURSE ONLY: CPT

## 2018-11-08 PROCEDURE — 90686 IIV4 VACC NO PRSV 0.5 ML IM: CPT

## 2018-11-08 NOTE — PROGRESS NOTES
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
## 2018-11-20 ENCOUNTER — RADIANT APPOINTMENT (OUTPATIENT)
Dept: MAMMOGRAPHY | Facility: CLINIC | Age: 58
End: 2018-11-20
Attending: FAMILY MEDICINE
Payer: COMMERCIAL

## 2018-11-20 DIAGNOSIS — Z00.00 ENCOUNTER FOR ROUTINE ADULT HEALTH EXAMINATION WITHOUT ABNORMAL FINDINGS: ICD-10-CM

## 2018-11-20 PROCEDURE — 77063 BREAST TOMOSYNTHESIS BI: CPT

## 2018-11-20 PROCEDURE — 77067 SCR MAMMO BI INCL CAD: CPT

## 2018-12-13 ENCOUNTER — ONCOLOGY VISIT (OUTPATIENT)
Dept: ONCOLOGY | Facility: CLINIC | Age: 58
End: 2018-12-13
Attending: INTERNAL MEDICINE
Payer: COMMERCIAL

## 2018-12-13 ENCOUNTER — APPOINTMENT (OUTPATIENT)
Dept: LAB | Facility: CLINIC | Age: 58
End: 2018-12-13
Attending: INTERNAL MEDICINE
Payer: COMMERCIAL

## 2018-12-13 VITALS
WEIGHT: 134.4 LBS | HEART RATE: 82 BPM | BODY MASS INDEX: 22.94 KG/M2 | TEMPERATURE: 98.9 F | SYSTOLIC BLOOD PRESSURE: 121 MMHG | DIASTOLIC BLOOD PRESSURE: 80 MMHG | HEIGHT: 64 IN | OXYGEN SATURATION: 98 % | RESPIRATION RATE: 16 BRPM

## 2018-12-13 DIAGNOSIS — C85.80 LYMPHOMA MALIGNANT, LARGE CELL (H): ICD-10-CM

## 2018-12-13 DIAGNOSIS — R25.2 LEG CRAMPS: ICD-10-CM

## 2018-12-13 LAB
ALBUMIN SERPL-MCNC: 3.7 G/DL (ref 3.4–5)
ALP SERPL-CCNC: 59 U/L (ref 40–150)
ALT SERPL W P-5'-P-CCNC: 24 U/L (ref 0–50)
ANION GAP SERPL CALCULATED.3IONS-SCNC: 6 MMOL/L (ref 3–14)
AST SERPL W P-5'-P-CCNC: 22 U/L (ref 0–45)
BASOPHILS # BLD AUTO: 0 10E9/L (ref 0–0.2)
BASOPHILS NFR BLD AUTO: 0.4 %
BILIRUB SERPL-MCNC: 0.3 MG/DL (ref 0.2–1.3)
BUN SERPL-MCNC: 14 MG/DL (ref 7–30)
CALCIUM SERPL-MCNC: 8.4 MG/DL (ref 8.5–10.1)
CHLORIDE SERPL-SCNC: 106 MMOL/L (ref 94–109)
CO2 SERPL-SCNC: 29 MMOL/L (ref 20–32)
CREAT SERPL-MCNC: 0.74 MG/DL (ref 0.52–1.04)
DIFFERENTIAL METHOD BLD: NORMAL
EOSINOPHIL # BLD AUTO: 0.1 10E9/L (ref 0–0.7)
EOSINOPHIL NFR BLD AUTO: 2 %
ERYTHROCYTE [DISTWIDTH] IN BLOOD BY AUTOMATED COUNT: 12.5 % (ref 10–15)
GFR SERPL CREATININE-BSD FRML MDRD: 80 ML/MIN/1.7M2
GLUCOSE SERPL-MCNC: 104 MG/DL (ref 70–99)
HCT VFR BLD AUTO: 41.1 % (ref 35–47)
HGB BLD-MCNC: 14.1 G/DL (ref 11.7–15.7)
IMM GRANULOCYTES # BLD: 0 10E9/L (ref 0–0.4)
IMM GRANULOCYTES NFR BLD: 0.2 %
LYMPHOCYTES # BLD AUTO: 1.5 10E9/L (ref 0.8–5.3)
LYMPHOCYTES NFR BLD AUTO: 29.6 %
MAGNESIUM SERPL-MCNC: 2.2 MG/DL (ref 1.6–2.3)
MCH RBC QN AUTO: 31.5 PG (ref 26.5–33)
MCHC RBC AUTO-ENTMCNC: 34.3 G/DL (ref 31.5–36.5)
MCV RBC AUTO: 92 FL (ref 78–100)
MONOCYTES # BLD AUTO: 0.4 10E9/L (ref 0–1.3)
MONOCYTES NFR BLD AUTO: 8.6 %
NEUTROPHILS # BLD AUTO: 3 10E9/L (ref 1.6–8.3)
NEUTROPHILS NFR BLD AUTO: 59.2 %
NRBC # BLD AUTO: 0 10*3/UL
NRBC BLD AUTO-RTO: 0 /100
PHOSPHATE SERPL-MCNC: 3.6 MG/DL (ref 2.5–4.5)
PLATELET # BLD AUTO: 157 10E9/L (ref 150–450)
POTASSIUM SERPL-SCNC: 4.1 MMOL/L (ref 3.4–5.3)
PROT SERPL-MCNC: 6.1 G/DL (ref 6.8–8.8)
RBC # BLD AUTO: 4.47 10E12/L (ref 3.8–5.2)
SODIUM SERPL-SCNC: 140 MMOL/L (ref 133–144)
WBC # BLD AUTO: 5.1 10E9/L (ref 4–11)

## 2018-12-13 PROCEDURE — G0463 HOSPITAL OUTPT CLINIC VISIT: HCPCS | Mod: ZF

## 2018-12-13 PROCEDURE — 85025 COMPLETE CBC W/AUTO DIFF WBC: CPT | Performed by: INTERNAL MEDICINE

## 2018-12-13 PROCEDURE — 36415 COLL VENOUS BLD VENIPUNCTURE: CPT

## 2018-12-13 PROCEDURE — 83735 ASSAY OF MAGNESIUM: CPT | Performed by: INTERNAL MEDICINE

## 2018-12-13 PROCEDURE — 99214 OFFICE O/P EST MOD 30 MIN: CPT | Mod: GC | Performed by: INTERNAL MEDICINE

## 2018-12-13 PROCEDURE — 84100 ASSAY OF PHOSPHORUS: CPT | Performed by: INTERNAL MEDICINE

## 2018-12-13 PROCEDURE — 80053 COMPREHEN METABOLIC PANEL: CPT | Performed by: INTERNAL MEDICINE

## 2018-12-13 ASSESSMENT — PAIN SCALES - GENERAL: PAINLEVEL: NO PAIN (0)

## 2018-12-13 ASSESSMENT — MIFFLIN-ST. JEOR: SCORE: 1161.76

## 2018-12-13 NOTE — NURSING NOTE
"Oncology Rooming Note    December 13, 2018 2:21 PM   Nancy Catalan is a 58 year old female who presents for:    Chief Complaint   Patient presents with     Labs Only     labs drawn with vpt by rn.  vs taken     RECHECK     ONC Lyphoma 6 mo f/up     Initial Vitals: /80 (BP Location: Right arm, Patient Position: Sitting, Cuff Size: Adult Regular)   Pulse 82   Temp 98.9  F (37.2  C) (Oral)   Resp 16   Ht 1.613 m (5' 3.5\")   Wt 61 kg (134 lb 6.4 oz)   SpO2 98%   BMI 23.43 kg/m   Estimated body mass index is 23.43 kg/m  as calculated from the following:    Height as of this encounter: 1.613 m (5' 3.5\").    Weight as of this encounter: 61 kg (134 lb 6.4 oz). Body surface area is 1.65 meters squared.  No Pain (0) Comment: Data Unavailable   No LMP recorded. Patient has had an ablation.  Allergies reviewed: Yes  Medications reviewed: Yes    Medications: Medication refills not needed today.  Pharmacy name entered into iPowow: [facility] [id number] [facility] [address]    Clinical concerns: none      6 minutes for nursing intake (face to face time)     Sloane CARMELINA Smith              "

## 2018-12-13 NOTE — PROGRESS NOTES
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, diagnosed October 2014, now in complete remission    Treatment summary/ptogress to date  Chemotherapy: Cycle 1A, 1B, 2A 1 R-HyperCVAD + neulasta completed 12/2015    Auto PB HSCT with BEAM conditioning 1/29/15    Rituxan maintenance x3 completed May 2015    CNS Therapy to date  10/22/14 AraC 100 mg via LP (CSF +)  10/27/14 MTX 12 mg via LP (CSF +)  10/30/14 AraC 100 mg via LP (CSF -)  11/4/14 MTX 12 mg via Omaya (CSF -)  11/10/14 AraC 100 mg via Omaya (CSF -)  11/18/14 MTX via Omaya (CSF -)  11/24/14 MTX via Omaya (CSF- )  12/2/14 AraC via Omaya (CSF -)  12/19 MTX via Omaya (CSF- )  1/2/14 AraC via Omaya (CSF -)    HPI 56 year old female in excellent health presented to her PCP with new lower back pain on September 2, 2014. She subsequently developed lower abdominal pain and bloating accompanied by progressive fatigue and WEISS. She has been accustomed to exercising daily and developed very low endurance and severe fatigue. No fevers, chills, sweats. CBC was performed and showed cells suspicious for low grade lymphoma. BMBx was performed and was consistent with high grade lymphoma vs ALL. The BMBx was sent to [place] for second opinion and they called it: High Grade lymphoma features intermediate between DLBL and Burkitt. Our own pathologists called it a high grade B cell lymphoma with unusual feature of TdT positivity. Cytogenetics shows 53-55XX with numerous abnormalities. FISH showed rearrangements Involving BCL6, MYC, and BCL2 (\"tripple hit\" lymphoma). PET 10/7/14 showed: 1. Paravertebral soft tissue mass/thickening extending from lower chest to retroperitoneum,mesentery (lower abd/pelvis), anetirior mediastinum, 2. Diffuse skeletal involvement,3. Spleen normal size but hypermetabolic.      She had a staging LP on 10/22/14 which showed CNS involvement by lymphoma. So twice weekly IT treatments " "were initiated (alternating MTX 12mg with AraC 100mg). CSF on 10/22 and 10/27 showed involvement by lymphoma but was negative on 10/20 and 11/4, so IT treatments were subsequently scaled back to once per week as of 11/4 (subsequent IT therapy was one week later). Dr. Lopez placed an Omaya on 11/3/14.     She has now completed 3 cycles of chemo therapy (1A, 1B, and 2A of RHyperCVAD) and PB auto HSCT (Dr. Drake) on 1/29/15 and 3 infusions of Rituxan maintenance.     She has some pulmonary nodules on CT which are being monitored (they were stable between March 2015 and May 2015 imaging). She has some thyroid nodules that were biopsied (benign).    She saw Dr. Drake in February for one year staging and Dr. Valentine in July for 1.5 year staging, including CT scans (which were all negative).     She presents today for follow up.    Energy stable. No fevers, chills, recent infections. No significant aches or pains. No N/V/D. No change in medications. Got a flu shot and the shingles shot. Has some tongue sensitivity. No SOB or CP.     ROS complete ROS performed. Neg except as noted in HPI    PMH  Reviewed     Meds  Current Outpatient Medications   Medication     acetaminophen (TYLENOL) 325 MG tablet     Calcium Citrate-Vitamin D (CALCIUM + D PO)     cholecalciferol (VITAMIN  -D) 1000 UNITS capsule     cyclobenzaprine (FLEXERIL) 10 MG tablet     ibuprofen (ADVIL) 200 MG tablet     multivitamin, therapeutic with minerals (MULTI-VITAMIN) TABS     Omega-3 Fatty Acids (OMEGA-3 FISH OIL PO)     No current facility-administered medications for this visit.        PE  /80 (BP Location: Right arm, Patient Position: Sitting, Cuff Size: Adult Regular)   Pulse 82   Temp 98.9  F (37.2  C) (Oral)   Resp 16   Ht 1.613 m (5' 3.5\")   Wt 61 kg (134 lb 6.4 oz)   SpO2 98%   BMI 23.43 kg/m      Gen: awake, alert, bright affect  HEENT: MM moist, no erythema, no lesions, anicteric  CHEST: ctab, no w/r/r  CV: rrr, no m/r/g  Abd: soft, " nontender, no organomegaly palpated  Ext: no LE edema.   Skin: no rash  Neuro: alert, conversing appropriately  Lymph: no supraclavicular, cervical, axillary, inguinal LAD    Labs.  CBC RESULTS:   Recent Labs   Lab Test 12/13/18  1414   WBC 5.1   RBC 4.47   HGB 14.1   HCT 41.1   MCV 92   MCH 31.5   MCHC 34.3   RDW 12.5        Normal diff    Last Comprehensive Metabolic Panel:  Sodium   Date Value Ref Range Status   12/13/2018 140 133 - 144 mmol/L Final     Potassium   Date Value Ref Range Status   12/13/2018 4.1 3.4 - 5.3 mmol/L Final     Chloride   Date Value Ref Range Status   12/13/2018 106 94 - 109 mmol/L Final     Carbon Dioxide   Date Value Ref Range Status   12/13/2018 29 20 - 32 mmol/L Final     Anion Gap   Date Value Ref Range Status   12/13/2018 6 3 - 14 mmol/L Final     Glucose   Date Value Ref Range Status   12/13/2018 104 (H) 70 - 99 mg/dL Final     Urea Nitrogen   Date Value Ref Range Status   12/13/2018 14 7 - 30 mg/dL Final     Creatinine   Date Value Ref Range Status   12/13/2018 0.74 0.52 - 1.04 mg/dL Final     GFR Estimate   Date Value Ref Range Status   12/13/2018 80 >60 mL/min/1.7m2 Final     Comment:     Non  GFR Calc     Calcium   Date Value Ref Range Status   12/13/2018 8.4 (L) 8.5 - 10.1 mg/dL Final       Assessment and Plan  58 year old female s/p chemo and auto HSCT for Triple Hit aggressive B cell lymphoma, with Myc, BCL2, and BCL6 rearrangement indicative of very aggressive histology, stage IV with CNS involvement and circulating tumor cells at diagnosis. Her morphological features were not consistent with either DLBL nor Burkitt as is seen in 48% of double hit lymphomas (Petrich et al Blood 2014 Oct 9;124(5):2917-61). 7% of double hit lymphomas have a third hit (triple hit) as she does. This retrospective analysis of 311 patients from with double hit lymphomas from multiple institutions identified that intensive induction regimens (such as HyperCVAD/MA or  CODOX-M/IVAC) leads to improved CR rates, progression free survival and overall survival in comparison to RCHOP in these patients (Cristi at el Blood 2014). This study also identified high risk features specific to this setting: leukocytosis, LDH>3xULN, stage 3 or 4, CNS involvement. She has 3 of these 4 high risk features. She has high risk disease by this prognostic score system. In this study, 41% of high risk patients were alive at 2 years.      She has now completed definitive therapy for this disease. She is now > 3.5 years out from completion of therapy. Very few events occurred more than 2 years after diagnosis, suggesting that she is very unlikely to relapse at this time.  her risk of relapse at this point is significantly diminished (approximately 75% of patients with high risk features like hers who relapse, do so in the first 12 months)    We discussed the monitoring program again today: q6 mo H+P+labs until 5 yrs post auto. She will let us know if she has any new/worrisome symptoms. But I did explain that recurrence is usually not subtle and mild aches and pains are likely due to the effect of chemo + BMT. She will likely feel these as a side effect. They are not likely signs of relapse.    I discussed her Omaya with Dr. Lopez. He says that it is perfectly safe and with minimal risks to leave it in place. He recommends leaving it in place if it is not bothering her. If it is bothering her, it is a quick procedure to remove it and it is with minimal risk. She is welcome to follow up with him in his office if she would like to discuss this further. I explained these options to her today and she says it is not bothering her and would prefer to avoid an extra procedure at this time.    We agreed to leave the Omaya in for now.      Will repeat labs and see her in clinic in 6-12 months.    ID Pentamadine qmonth x1 year per BMT protocol (is now complete). She has had pulm nodules. We have monitored them with  CT scans, they have remained stable and small.    She had a shingles reactivation, now off ACV. She received shingrix and flu shot. She should have killed flu shot (not live vaccine) every fall.    No other infectious issues at this time.    She received her 14 month post-auto vaccines    Neuro I suspect the tongue sensation is likely neuropathy and it is not bothering her. This is stable and not very bothersome. She will call us if she develops any new symptoms or worrisome findings. With CNS disease, CNS relapse can be catastrophic, so she will have low threshold for calling us.    Muscle cramps: does not appear to be a significant issue at this time    Health Maintenance She has established care with a PCP and had a mamogram (negative) and DEXA scan (osteopenia managed by PCP). She was seen in our survivorship clinic and felt that this was very helpful.   She is keeping up with her age-appropriate screening and will f/u with derm  ([facility])      Plan   F/u with me in 12 months with labs    Jim Mann MD   [id number]  [facility] On[facility] Fellow    Seen and discussed with  Yesenia Horvath MD/PhD    I have seen, interviewed, and examined the patient independently.  I have reviewed the vital signs and labs.  This note reflects my assessment and plan.    Elizabeth is doing great. Will plan to see her in 1 year unless new symptoms develop. Labs and symptoms and exam are all normal, no sign of relapse. Relapse is very unlikely so far out from completion of therapy.    Yesenia Horvath MD/PhD

## 2018-12-13 NOTE — NURSING NOTE
Chief Complaint   Patient presents with     Labs Only     labs drawn with vpt by rn.  vs taken     Labs drawn with vpt by rn.  Pt tolerated well.  VS taken.  Pt checked in for next appt.    Suly Tanner RN

## 2018-12-13 NOTE — LETTER
"12/13/2018       RE: aNncy Catalan  [address] [facility] [address]     Dear Colleague,    Thank you for referring your patient, Nancy Catalan, to the [facility]. Please see a copy of my visit note below.    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, diagnosed October 2014, now in complete remission    Treatment summary/ptogress to date  Chemotherapy: Cycle 1A, 1B, 2A 1 R-HyperCVAD + neulasta completed 12/2015    Auto PB HSCT with BEAM conditioning 1/29/15    Rituxan maintenance x3 completed May 2015    CNS Therapy to date  10/22/14 AraC 100 mg via LP (CSF +)  10/27/14 MTX 12 mg via LP (CSF +)  10/30/14 AraC 100 mg via LP (CSF -)  11/4/14 MTX 12 mg via Omaya (CSF -)  11/10/14 AraC 100 mg via Omaya (CSF -)  11/18/14 MTX via Omaya (CSF -)  11/24/14 MTX via Omaya (CSF- )  12/2/14 AraC via Omaya (CSF -)  12/19 MTX via Omaya (CSF- )  1/2/14 AraC via Omaya (CSF -)    HPI 56 year old female in excellent health presented to her PCP with new lower back pain on September 2, 2014. She subsequently developed lower abdominal pain and bloating accompanied by progressive fatigue and WEISS. She has been accustomed to exercising daily and developed very low endurance and severe fatigue. No fevers, chills, sweats. CBC was performed and showed cells suspicious for low grade lymphoma. BMBx was performed and was consistent with high grade lymphoma vs ALL. The BMBx was sent to [facility] for second opinion and they called it: High Grade lymphoma features intermediate between DLBL and Burkitt. Our own pathologists called it a high grade B cell lymphoma with unusual feature of TdT positivity. Cytogenetics shows 53-55XX with numerous abnormalities. FISH showed rearrangements Involving BCL6, MYC, and BCL2 (\"tripple hit\" lymphoma). PET 10/7/14 showed: 1. Paravertebral soft tissue mass/thickening extending from lower chest to " retroperitoneum,mesentery (lower abd/pelvis), anetirior mediastinum, 2. Diffuse skeletal involvement,3. Spleen normal size but hypermetabolic.      She had a staging LP on 10/22/14 which showed CNS involvement by lymphoma. So twice weekly IT treatments were initiated (alternating MTX 12mg with AraC 100mg). CSF on 10/22 and 10/27 showed involvement by lymphoma but was negative on 10/20 and 11/4, so IT treatments were subsequently scaled back to once per week as of 11/4 (subsequent IT therapy was one week later). Dr. Lopez placed an Omaya on 11/3/14.     She has now completed 3 cycles of chemo therapy (1A, 1B, and 2A of RHyperCVAD) and PB auto HSCT (Dr. Drake) on 1/29/15 and 3 infusions of Rituxan maintenance.     She has some pulmonary nodules on CT which are being monitored (they were stable between March 2015 and May 2015 imaging). She has some thyroid nodules that were biopsied (benign).    She saw Dr. Drake in February for one year staging and Dr. Valentine in July for 1.5 year staging, including CT scans (which were all negative).     She presents today for follow up.    Energy stable. No fevers, chills, recent infections. No significant aches or pains. No N/V/D. No change in medications. Got a flu shot and the shingles shot. Has some tongue sensitivity. No SOB or CP.     ROS complete ROS performed. Neg except as noted in HPI    PMH  Reviewed     Meds  Current Outpatient Medications   Medication     acetaminophen (TYLENOL) 325 MG tablet     Calcium Citrate-Vitamin D (CALCIUM + D PO)     cholecalciferol (VITAMIN  -D) 1000 UNITS capsule     cyclobenzaprine (FLEXERIL) 10 MG tablet     ibuprofen (ADVIL) 200 MG tablet     multivitamin, therapeutic with minerals (MULTI-VITAMIN) TABS     Omega-3 Fatty Acids (OMEGA-3 FISH OIL PO)     No current facility-administered medications for this visit.        PE  /80 (BP Location: Right arm, Patient Position: Sitting, Cuff Size: Adult Regular)   Pulse 82   Temp 98.9  F  "(37.2  C) (Oral)   Resp 16   Ht 1.613 m (5' 3.5\")   Wt 61 kg (134 lb 6.4 oz)   SpO2 98%   BMI 23.43 kg/m       Gen: awake, alert, bright affect  HEENT: MM moist, no erythema, no lesions, anicteric  CHEST: ctab, no w/r/r  CV: rrr, no m/r/g  Abd: soft, nontender, no organomegaly palpated  Ext: no LE edema.   Skin: no rash  Neuro: alert, conversing appropriately  Lymph: no supraclavicular, cervical, axillary, inguinal LAD    Labs.  CBC RESULTS:   Recent Labs   Lab Test 12/13/18  1414   WBC 5.1   RBC 4.47   HGB 14.1   HCT 41.1   MCV 92   MCH 31.5   MCHC 34.3   RDW 12.5        Normal diff    Last Comprehensive Metabolic Panel:  Sodium   Date Value Ref Range Status   12/13/2018 140 133 - 144 mmol/L Final     Potassium   Date Value Ref Range Status   12/13/2018 4.1 3.4 - 5.3 mmol/L Final     Chloride   Date Value Ref Range Status   12/13/2018 106 94 - 109 mmol/L Final     Carbon Dioxide   Date Value Ref Range Status   12/13/2018 29 20 - 32 mmol/L Final     Anion Gap   Date Value Ref Range Status   12/13/2018 6 3 - 14 mmol/L Final     Glucose   Date Value Ref Range Status   12/13/2018 104 (H) 70 - 99 mg/dL Final     Urea Nitrogen   Date Value Ref Range Status   12/13/2018 14 7 - 30 mg/dL Final     Creatinine   Date Value Ref Range Status   12/13/2018 0.74 0.52 - 1.04 mg/dL Final     GFR Estimate   Date Value Ref Range Status   12/13/2018 80 >60 mL/min/1.7m2 Final     Comment:     Non  GFR Calc     Calcium   Date Value Ref Range Status   12/13/2018 8.4 (L) 8.5 - 10.1 mg/dL Final       Assessment and Plan  58 year old female s/p chemo and auto HSCT for Triple Hit aggressive B cell lymphoma, with Myc, BCL2, and BCL6 rearrangement indicative of very aggressive histology, stage IV with CNS involvement and circulating tumor cells at diagnosis. Her morphological features were not consistent with either DLBL nor Burkitt as is seen in 48% of double hit lymphomas (Petrich et al Blood 2014 Oct " 9;124(3):5133-88). 7% of double hit lymphomas have a third hit (triple hit) as she does. This retrospective analysis of 311 patients from with double hit lymphomas from multiple institutions identified that intensive induction regimens (such as HyperCVAD/MA or CODOX-M/IVAC) leads to improved CR rates, progression free survival and overall survival in comparison to RCHOP in these patients (Cristi at  Blood 2014). This study also identified high risk features specific to this setting: leukocytosis, LDH>3xULN, stage 3 or 4, CNS involvement. She has 3 of these 4 high risk features. She has high risk disease by this prognostic score system. In this study, 41% of high risk patients were alive at 2 years.      She has now completed definitive therapy for this disease. She is now > 3.5 years out from completion of therapy. Very few events occurred more than 2 years after diagnosis, suggesting that she is very unlikely to relapse at this time.  her risk of relapse at this point is significantly diminished (approximately 75% of patients with high risk features like hers who relapse, do so in the first 12 months)    We discussed the monitoring program again today: q6 mo H+P+labs until 5 yrs post auto. She will let us know if she has any new/worrisome symptoms. But I did explain that recurrence is usually not subtle and mild aches and pains are likely due to the effect of chemo + BMT. She will likely feel these as a side effect. They are not likely signs of relapse.    I discussed her Omaya with Dr. Lopez. He says that it is perfectly safe and with minimal risks to leave it in place. He recommends leaving it in place if it is not bothering her. If it is bothering her, it is a quick procedure to remove it and it is with minimal risk. She is welcome to follow up with him in his office if she would like to discuss this further. I explained these options to her today and she says it is not bothering her and would prefer to avoid  an extra procedure at this time.    We agreed to leave the Omaya in for now.      Will repeat labs and see her in clinic in 6-12 months.    ID Pentamadine qmonth x1 year per BMT protocol (is now complete). She has had pulm nodules. We have monitored them with CT scans, they have remained stable and small.    She had a shingles reactivation, now off ACV. She received shingrix and flu shot. She should have killed flu shot (not live vaccine) every fall.    No other infectious issues at this time.    She received her 14 month post-auto vaccines    Neuro I suspect the tongue sensation is likely neuropathy and it is not bothering her. This is stable and not very bothersome. She will call us if she develops any new symptoms or worrisome findings. With CNS disease, CNS relapse can be catastrophic, so she will have low threshold for calling us.    Muscle cramps: does not appear to be a significant issue at this time    Health Maintenance She has established care with a PCP and had a mamogram (negative) and DEXA scan (osteopenia managed by PCP). She was seen in our survivorship clinic and felt that this was very helpful.   She is keeping up with her age-appropriate screening and will f/u with derm  ([facility])      Plan   F/u with me in 12 months with labs    Jim Mann MD   PGY6  [facility] Fellow    Seen and discussed with  Yesenia Horvath MD/PhD    I have seen, interviewed, and examined the patient independently.  I have reviewed the vital signs and labs.  This note reflects my assessment and plan.    Elizabeth is doing great. Will plan to see her in 1 year unless new symptoms develop. Labs and symptoms and exam are all normal, no sign of relapse. Relapse is very unlikely so far out from completion of therapy.    Yesenia Horvath MD/PhD

## 2018-12-28 ENCOUNTER — VIRTUAL VISIT (OUTPATIENT)
Dept: FAMILY MEDICINE | Facility: OTHER | Age: 58
End: 2018-12-28

## 2018-12-28 NOTE — PROGRESS NOTES
"Date:   Clinician: Ingrid Perales  Clinician NPI: [id number]  Patient: Nancy Catalan  Patient : 1960  Patient Address: [address] [place] [address] [place] [address]  Patient Phone: [phone]  Visit Protocol: URI  Patient Summary:  Nancy is a 58 year old ( : 1960 ) female who initiated a Visit for cold, sinus infection, or influenza. When asked the question \"Please sign me up to receive news, health information and promotions from [facility].\", Nancy responded \"No\".    Nancy states her symptoms started gradually 2-3 weeks ago. After her symptoms started, they improved and then got worse again.   Her symptoms consist of myalgia, a headache, malaise, facial pain or pressure, rhinitis, and nasal congestion. She is experiencing difficulty breathing due to nasal congestion but she is not short of breath.   Symptom details     Nasal secretions: The color of her mucus is green.    Facial pain or pressure: The facial pain or pressure feels worse when bending over or leaning forward.     Headache: She states the headache is moderate (4-6 on a 10 point pain scale).      Nancy denies having wheezing, teeth pain, enlarged lymph nodes, sore throat, ear pain, fever, cough, and chills. She also denies taking antibiotic medication for the symptoms and having recent facial or sinus surgery in the past 60 days.   She has not recently been exposed to someone with influenza. Nancy has not been in close contact with any high risk individuals.   Nancy had 1 sinus infection within the past year.   Weight: 133 lbs   Nancy does not smoke or use smokeless tobacco.   MEDICATIONS: Vicks DayQuil-NyQuil Cold-Flu oral, ALLERGIES: NKDA  Clinician Response:  Dear Nancy,  Based on the information provided, you have acute bacterial sinusitis, also known as a sinus infection. Sinus infections are caused by bacteria or a virus and symptoms are almost always identical. The difference between the 2 types of " infections is timing.  Sinus infections start as viral infections and symptoms improve on their own in about 7 days. If symptoms have not improved after 7 days or have even worsened, a bacterial infection may have developed.  Medication information  I am prescribing:       Fluticasone 50 mcg/actuation nasal spray. Inhale 2 sprays in each nostril 1 time per day; after 1 week, may adjust to 1 - 2 sprays in each nostril 1 time per day. This medication takes several days to start working, so keep taking it even if it doesn't help right away. There are no refills with this prescription.      Azithromycin (Zithromax Z-Juan Antonio) 250 mg oral tablet. Take 2 tablets by mouth on day 1, then 1 tablet by mouth on days 2-5. There are no refills with this prescription.     Antibiotics can cause an allergic reaction even if you have taken them without a problem in the past. If you develop a new rash, swelling, or difficulty breathing, stop the medication and be seen in a clinic or urgent care immediately.  A yeast infection is a side effect of taking antibiotics in some women. Please use [facility] to get treatment if you have symptoms of a yeast infection.  Unless you are allergic to the over-the-counter medication(s) below, I recommend using:       Acetaminophen (Tylenol or store brand) oral tablet. Take 1-2 tablets by mouth every 4-6 hours to help with the discomfort.    A decongestant such as Sudafed PE or store brand.      Guaifenesin + dextromethorphan (Robitussin DM, Mucinex DM, or store brand).      Saline nasal spray (Wise or store brand). Use 1-2 sprays in each nostril 3 times a day as needed for congestion.     Over-the-counter medications do not require a prescription. Ask the pharmacist if you have any questions.  Self care  The following tips will keep you as comfortable as possible while you recover:     Rest    Drink plenty of water and other liquids    Take a hot shower to loosen congestion     When to seek care  Please be  seen in a clinic or urgent care if any of the following occur:     Symptoms do not start to improve after 3 days of treatment    New symptoms develop, or symptoms become worse      Diagnosis: Acute bacterial sinusitis  Diagnosis ICD: J01.90  Prescription: fluticasone 50 mcg/actuation nasal spray,suspension 1 120 spray aerosol with adapter (grams), 30 days supply. Inhale 2 sprays in each nostril 1 time per day; after 1 week, may adjust to 1 - 2 sprays in each nostril 1 time per day.. Refills: 0, Refill as needed: no, Allow substitutions: yes  Prescription: azithromycin (Zithromax Z-Juan Antonio) 250 mg oral tablet 6 tablet, 5 days supply. Take 2 tablets by mouth on day 1, then 1 tablet by mouth on days 2-5. Refills: 0, Refill as needed: no, Allow substitutions: yes  Pharmacy: [id number] - [phone] - [address] [address]

## 2019-01-09 DIAGNOSIS — Z87.828 HISTORY OF BICYCLE ACCIDENT: ICD-10-CM

## 2019-01-09 RX ORDER — CYCLOBENZAPRINE HCL 10 MG
TABLET ORAL
Qty: 30 TABLET | Refills: 3 | Status: SHIPPED | OUTPATIENT
Start: 2019-01-09 | End: 2019-08-27

## 2019-01-09 NOTE — TELEPHONE ENCOUNTER
Requested Prescriptions   Pending Prescriptions Disp Refills     cyclobenzaprine (FLEXERIL) 10 MG tablet 30 tablet 0    There is no refill protocol information for this order          cyclobenzaprine (FLEXERIL) 10 MG tablet      Last Written Prescription Date:  10/17/18  Last Fill Quantity: 30,   # refills: 0  Last Office Visit: 12/28/18  Future Office visit:       Routing refill request to provider for review/approval because:  Drug not on the [facility] or [facility] refill protocol or controlled substance

## 2019-01-10 ENCOUNTER — TELEPHONE (OUTPATIENT)
Dept: DERMATOLOGY | Facility: CLINIC | Age: 59
End: 2019-01-10

## 2019-01-10 NOTE — TELEPHONE ENCOUNTER
Dermatology Pre-visit Call:    Reason for visit : Skin check     Any personal history of skin cancers: Yes, melanoma in 2017.     Any family history of skin cancers: Yes, cousin, and 2 siblings.     Was the patient referred: No    If the patient was referred, are records obtained: No.     Has the patient seen a dermatologist in the past: Yes. Records obtained: Yes    Patient Reminders Given:  --Please, make sure you bring an updated list of your medications, allergies and insurance information.  --Plan on being in our facility for approximately one hour, this includes the registration process, office visit, education and check-out process.  If you are having a procedure, more time may be required.     --We are located on the second floor of the building, check in desk D.   --If you need to cancel or reschedule, call [phone].  --We look forward to seeing you in Dermatology Clinic.

## 2019-01-10 NOTE — TELEPHONE ENCOUNTER
Left message for patient to call [phone] for previsit questions.  Also left message asking if the patient could com at 1:00 instead of 12:45    Doloressiena Olea CMA

## 2019-01-24 ENCOUNTER — OFFICE VISIT (OUTPATIENT)
Dept: DERMATOLOGY | Facility: CLINIC | Age: 59
End: 2019-01-24

## 2019-01-24 DIAGNOSIS — Z80.8 FAMILY HISTORY OF MELANOMA: ICD-10-CM

## 2019-01-24 DIAGNOSIS — Z85.820 HISTORY OF MELANOMA: Primary | ICD-10-CM

## 2019-01-24 DIAGNOSIS — L82.1 SEBORRHEIC KERATOSIS: ICD-10-CM

## 2019-01-24 DIAGNOSIS — L57.8 PHOTOAGING OF SKIN: ICD-10-CM

## 2019-01-24 DIAGNOSIS — L98.8 RHYTIDES: ICD-10-CM

## 2019-01-24 DIAGNOSIS — L57.8 SUN-DAMAGED SKIN: ICD-10-CM

## 2019-01-24 DIAGNOSIS — L81.4 SOLAR LENTIGO: ICD-10-CM

## 2019-01-24 DIAGNOSIS — D22.9 MULTIPLE BENIGN NEVI: ICD-10-CM

## 2019-01-24 DIAGNOSIS — D18.01 CHERRY ANGIOMA: ICD-10-CM

## 2019-01-24 PROCEDURE — 99214 OFFICE O/P EST MOD 30 MIN: CPT | Performed by: DERMATOLOGY

## 2019-01-24 ASSESSMENT — PAIN SCALES - GENERAL: PAINLEVEL: NO PAIN (0)

## 2019-01-24 NOTE — NURSING NOTE
Nancy Catalan's goals for this visit include:   Chief Complaint   Patient presents with     Derm Problem     skin check hx of melanoma / Pt states that she has hx of melanoma and lymphoma.        She requests these members of her care team be copied on today's visit information: Yes    PCP: Federica Ashraf    Referring Provider:  No referring provider defined for this encounter.    There were no vitals taken for this visit.    Do you need any medication refills at today's visit? No    Gonzalo De CMA ([facility])
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
## 2019-01-24 NOTE — PROGRESS NOTES
DIAGNOSIS:          Skin, right anterior shoulder, shave biopsy:          - Malignant melanoma with the following features:              Histologic subtype: superficial spreading              Breslow depth: 0.22 mm              Teja level: II              Ulceration: not observed             Mitotic rate: 0 per mm2              Regression: not observed              Angiolymphatic/perineural invasion: not observed              Microsatellitosis: not observed              Host response: Tumor infiltrating lymphocytes present,            non-brisk              Associated nevus: present              Margins:  Uninvolved by melanoma            Invasive and in situ melanoma free of margins            Melanocytic nevus present at deep margin              AJCC Pathologic Stage (pTNM)                 Primary Tumor (pT): pT1a                 Regional Lymph Nodes (pN): N/A                 Distant Metastasis (pM): N/A

## 2019-01-28 ENCOUNTER — TELEPHONE (OUTPATIENT)
Dept: DERMATOLOGY | Facility: CLINIC | Age: 59
End: 2019-01-28

## 2019-01-28 NOTE — TELEPHONE ENCOUNTER
I left a message for patient to call [facility].  Writer called to discuss Clear & [facility] laser treatment as recommended by Dr. Valles.    Albertina Gibbs RN

## 2019-01-30 NOTE — TELEPHONE ENCOUNTER
Pt called and information below reviewed with pt. Pt verbalized understanding. Pt states that she would be interested in scheduling in the fall. Pt states that she will call back to schedule...Citlali Elaine RN            PRECAUTIONS TO CONSIDER BEFORE CLEAR AND BRILLIANT  TREATMENTS  SIX TO TWELVE MONTHS BEFORE TREATMENT    Stop use of Accutane    TWO WEEKS BEFORE TREATMENT    Stop use of all Retinols   o Retin A, Tazorac,  anti-aging  products    Stop use of all glycolic acid treatments (longer if deeper peel)    Stop use of all salicylic acid products    Stop excessive sun exposure 2-4 weeks prior to treatment    Stop waxing    Stop abrasive scrubs    Stop microdermabrasion treatments    Talk to your doctor if you have ever had a cold sore    OTHER PRE-TREATMENT CONSIDERATIONS    Are you pregnant or breastfeeding?   o If yes, you are not a candidate at this time.       Stay Hydrated!  o Drink plenty of water before, during and after your treatment.  This will help improve your healing process.        Tendency to hyperpigment?   o Talk to your doctor about starting a bleaching regiment four to six weeks before your treatment series      Be prepared!  o Two to three weeks pre and post treatment, it is a good idea to avoid excessive sun exposure.  o Broad spectrum (UVA/UVB) sunblock must be worn to protect from sun exposure.      Patients with history of autoimmune Disease (such as Lupus and Rheumatoid arthritis)  o Are not candidates for treatment.        Patients with history of keloid formation      Patients with active bacterial, viral or fungal infection    What to Expect After Clear and Brilliant Treatment:  Clear and [place] Treatment produces side effects. Notify your physician if the severity of your side effects becomes a problem for you.     I will experience redness, swelling, peeling, pain, and heat sensation. I may experience bleeding, oozing, itching, or acne. Risks are blistering, permanent  scarring, temporary or permanent skin lightening or darkening, infection, and eye injury. I understand my outcome could be no improvement, slight improvement. Multiple treatments may be required.      What you may feel and look like:      Immediately after the treatment, you will experience redness, swelling and sometimes pinpoint bleeding. You will notice most of the swelling on the first morning after treatment, particularly under the eyes. Swelling usually lasts two to three days. To minimize swelling do the following:    Apply cold compresses to the treatment area for 10 minutes of every hour on the day of treatment, until you go to bed.    Sleep elevated the first night. Use as many pillows as you can tolerate.      Heat sensation can be intense for the following 2 - 3 hours. Occasionally oozing can occur in isolated areas for a few days as well.      Over the next few days, redness may worsen. Swelling may be significant and cause some discomfort.       You may also notice that your skin appears bronzed or little dark dots will appear on the treated area. Your skin may feel dry, peel, or flake. You may notice a  sandpaper  texture a few days after treatment. This is the treated tissue working its way out of your body as new fresh skin is regenerated.      This dead skin is a normal result of laser treatment, and should start sloughing off 3 - 4 days after the treatment. Most patients complete this process 5 - 7 days after a treatment on the face.       Once the sloughing is complete, you may notice some pinkness over the next few weeks. Most redness resolves during the first week after treatment, but a jose luis  glow  can remain for several weeks. If you wish, you can apply makeup to minimize the redness.      Some patients have also experienced itching.            How to Care for Your Skin After Treatment    Congratulations! You have taken the first step toward more healthy and radiant looking skin by having a  Fraxel  DUAL 1550/1927 laser treatment. Now it is important to help your skin heal quickly and protect your skin investment.     Your after treatment skin care regimen is tailored to the treatment you received today. Follow the instructions as checked below:      Immediately After Treatment. Use a bland moisturizer (i.e. Cetaphil  cream) or a very thin layer of petrolatum ointment (i.e. Aquaphor ). Use petrolatum ointment to cover any area with oozing and keep moist. Use of icepacks helps alleviate the heat sensation. You may also cleanse your face with a mild cleanser.      First Few Days. Continue cleansing and moisturizing over the next few days. Once the sloughing starts, please allow your skin to heal and DO NOT scrub, rub, or use exfoliants. Keep clothing away from treated body parts as much as possible to avoid irritation.Do not sleep flat. Sleep with head elevated/sitting up.       First Week of Healing. Keep treated area clean; avoid smoking, excessive alcohol consumption, excessive exercise, perspiring, swimming, or exposing skin to heat and sun.      Skin Care Products. All of your skin care products should be non-irritating and non-clogging for the first week or so after a Fraxel treatment. Examples of brands that offer very gentle and inexpensive products that are ideal to use: Aveeno , Dove , Neutrogena , and Cetaphil .      Scrubs, Toners, Glycolic Acid, and Retin A. Your skin will be sensitive for the first week or so after treatment. Do not use products that will cause irritation during this time. Do not use abrasive scrubs, toners, or products that contain glycolyic acids or Retin A. Read the product labels.       Normal Skin Care Regimen. Once the sloughing is complete, you may resume your routine skin care and make-up products, as long as they are tolerable to you.      Sunscreen. It is very important that you use sunscreen to prevent sun damage to the skin. Sunscreen should offer broadband  protection (UVA and UVB) and have a sun protection factor (SPF) of 30 or more. Once sloughing is complete, use sunscreen daily for at least 3 months after your last treatment. Apply sunscreen 20 minutes before going outside, and again, immediately before. Reapply sunscreen every 2 hours. If direct sun exposure is necessary, wear a hat and clothing that covers the treated area. Your practice of diligent sunscreen use may lower the risk of laser-induced hyperpigmentation (darker color).       Moisturizer. Remember that peeling and/or flaking is normal during the healing process. Therefore, the moisturizer you use should be non-irritating and non-clogging, or else you could develop breakouts. During the healing period, your normal moisturizer may be too occlusive, so consider products from the brands listed above. Instead of using 2 separate products, use moisturizers that contain SPF30+. Reapply whenever your skin feels dry.      Bleaching Creams (hydroquinone). Discontinue use of your bleaching cream while your skin is tender.      Resume your normal skin care regimen when your skin has fully healed.      Cold Sores. If you have a history of cold sores, ask your doctor about care!      Abnormal Healing. If you notice any blisters, cuts, bruises, crusting/scabs, areas of raw skin, ulcerations, active bleeding, increased discomfort or pain, pigment changes (lighter or darker than usual complexion), or any other problems, please contact us as soon as possible.    Who should I call with questions?    [facility]: [phone]       [facility]: [phone]      For urgent needs outside of business hours call the [facility] at [phone] and ask for the dermatology resident on call

## 2019-04-24 ENCOUNTER — OFFICE VISIT (OUTPATIENT)
Dept: DERMATOLOGY | Facility: CLINIC | Age: 59
End: 2019-04-24
Payer: COMMERCIAL

## 2019-04-24 DIAGNOSIS — D18.01 CHERRY ANGIOMA: ICD-10-CM

## 2019-04-24 DIAGNOSIS — D22.9 MULTIPLE BENIGN NEVI: ICD-10-CM

## 2019-04-24 DIAGNOSIS — L82.1 SEBORRHEIC KERATOSIS: ICD-10-CM

## 2019-04-24 DIAGNOSIS — Z85.820 HISTORY OF MELANOMA: Primary | ICD-10-CM

## 2019-04-24 DIAGNOSIS — L81.4 SOLAR LENTIGO: ICD-10-CM

## 2019-04-24 DIAGNOSIS — Z80.8 FAMILY HISTORY OF MELANOMA: ICD-10-CM

## 2019-04-24 DIAGNOSIS — L57.8 SUN-DAMAGED SKIN: ICD-10-CM

## 2019-04-24 PROCEDURE — 99214 OFFICE O/P EST MOD 30 MIN: CPT | Performed by: DERMATOLOGY

## 2019-04-24 ASSESSMENT — PAIN SCALES - GENERAL: PAINLEVEL: NO PAIN (0)

## 2019-04-24 NOTE — NURSING NOTE
Nancy Catalan's goals for this visit include:   Chief Complaint   Patient presents with     Skin Check     Hx of Melanoma - 3-17 - lesion on left clavicle       She requests these members of her care team be copied on today's visit information: NO    PCP: Federica Ashraf    Referring Provider:  No referring provider defined for this encounter.    There were no vitals taken for this visit.    Do you need any medication refills at today's visit? NO    Dolores Olea, [facility]
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
## 2019-07-01 NOTE — TELEPHONE ENCOUNTER
ONCOLOGY INTAKE: Records Information      APPT INFORMATION:  Referring provider:  Hannah Valles  Referring provider s clinic:  [facility]  Reason for visit/diagnosis:  Family history melanoma  Has patient been notified of appointment date and time?: Yes    RECORDS INFORMATION:  Were the records received with the referral (via [employer])? No    Has patient been seen for any external appt for this diagnosis? No    If yes, where? n/a    Has patient had any imaging or procedures outside of [place] for this condition? No      If Yes, where? n/a    ADDITIONAL INFORMATION:  None

## 2019-07-01 NOTE — TELEPHONE ENCOUNTER
RECORDS STATUS - ALL OTHER DIAGNOSIS      [facility]   DATE RECEIVED: 8/27/19   NOTES STATUS DETAILS   OFFICE NOTE from referring provider Complete  Dr. Hannah Valles   OFFICE NOTE from medical oncologist N/A    DISCHARGE SUMMARY from hospital N/A    DISCHARGE REPORT from the ER N/A    OPERATIVE REPORT N/A    MEDICATION LIST Complete  Epic/CE   CLINICAL TRIAL TREATMENTS TO DATE N/A 4/8/15 Lymphoma   LABS     PATHOLOGY REPORTS Request  [facility] 11/9/17 [phone]     ANYTHING RELATED TO DIAGNOSIS     GENONOMIC TESTING     TYPE:     IMAGING (NEED IMAGES & REPORT)     CT SCANS N/A    MRI     MAMMO     ULTRASOUND     PET       Action    Action Taken 7/1/19 3:35pm     Called [facility] to request Bx and spoke with Mayela. Ph: [phone] Fax: [phone]. Sent a formal fax request.

## 2019-08-06 NOTE — TELEPHONE ENCOUNTER
Please see previous pre visit encounter.  Patient needed to change appointment date due to being out of town.

## 2019-08-27 ENCOUNTER — PRE VISIT (OUTPATIENT)
Dept: ONCOLOGY | Facility: CLINIC | Age: 59
End: 2019-08-27

## 2019-08-27 DIAGNOSIS — Z87.828 HISTORY OF BICYCLE ACCIDENT: ICD-10-CM

## 2019-08-27 RX ORDER — CYCLOBENZAPRINE HCL 10 MG
TABLET ORAL
Qty: 30 TABLET | Refills: 0 | Status: SHIPPED | OUTPATIENT
Start: 2019-08-27 | End: 2019-11-27

## 2019-08-27 NOTE — TELEPHONE ENCOUNTER
Routing refill request to provider for review/approval because:  Drug not on the [facility] refill protocol       Nadir Sheets RN, BSN, PHN

## 2019-08-27 NOTE — TELEPHONE ENCOUNTER
Requested Prescriptions   Pending Prescriptions Disp Refills     cyclobenzaprine (FLEXERIL) 10 MG tablet [Pharmacy Med Name: CYCLOBENZAPRINE 10MG TABLETS      Last Written Prescription Date:  01/09/19  Last Fill Quantity: 30 tablet,   # refills: 3  Last Office Visit: Dr. Ashraf 8/27/18  Future Office visit:    Next 5 appointments (look out 90 days)    Oct 03, 2019  2:00 PM CDT  Return Visit with Hannah Valles MD  [facility] ([facility]) [address] [place] [id number] [phone]           Routing refill request to provider for review/approval because:  Drug not on the [facility] or [facility] refill protocol or controlled substance   30 tablet 0     Sig: TAKE 1 TABLET BY MOUTH EVERY EVENING AS NEEDED FOR MUSCLE SPASMS       There is no refill protocol information for this order

## 2019-08-28 ENCOUNTER — PRE VISIT (OUTPATIENT)
Dept: ONCOLOGY | Facility: CLINIC | Age: 59
End: 2019-08-28

## 2019-11-25 ENCOUNTER — DOCUMENTATION ONLY (OUTPATIENT)
Dept: LAB | Facility: CLINIC | Age: 59
End: 2019-11-25

## 2019-11-25 DIAGNOSIS — Z00.00 ROUTINE ADULT HEALTH MAINTENANCE: Primary | ICD-10-CM

## 2019-11-25 NOTE — PROGRESS NOTES
Pt has a lab appointment on 11.27.19, please review their chart and add orders if necessary.    Thank you,    [facility]

## 2019-11-27 ENCOUNTER — OFFICE VISIT (OUTPATIENT)
Dept: FAMILY MEDICINE | Facility: CLINIC | Age: 59
End: 2019-11-27
Payer: COMMERCIAL

## 2019-11-27 VITALS
OXYGEN SATURATION: 99 % | TEMPERATURE: 97.8 F | BODY MASS INDEX: 21.44 KG/M2 | SYSTOLIC BLOOD PRESSURE: 119 MMHG | HEART RATE: 66 BPM | HEIGHT: 63 IN | RESPIRATION RATE: 16 BRPM | DIASTOLIC BLOOD PRESSURE: 77 MMHG | WEIGHT: 121 LBS

## 2019-11-27 DIAGNOSIS — Z00.00 ROUTINE ADULT HEALTH MAINTENANCE: ICD-10-CM

## 2019-11-27 DIAGNOSIS — Z85.820 HISTORY OF MELANOMA: ICD-10-CM

## 2019-11-27 DIAGNOSIS — Z87.828 HISTORY OF BICYCLE ACCIDENT: ICD-10-CM

## 2019-11-27 DIAGNOSIS — Z00.00 ROUTINE GENERAL MEDICAL EXAMINATION AT A HEALTH CARE FACILITY: Primary | ICD-10-CM

## 2019-11-27 DIAGNOSIS — C83.70 BURKITT LYMPHOMA, UNSPECIFIED BODY REGION (H): ICD-10-CM

## 2019-11-27 DIAGNOSIS — C85.10 B-CELL LYMPHOMA, UNSPECIFIED B-CELL LYMPHOMA TYPE, UNSPECIFIED BODY REGION (H): ICD-10-CM

## 2019-11-27 DIAGNOSIS — Z23 NEED FOR PROPHYLACTIC VACCINATION AND INOCULATION AGAINST INFLUENZA: ICD-10-CM

## 2019-11-27 DIAGNOSIS — E28.39 ESTROGEN DEFICIENCY: ICD-10-CM

## 2019-11-27 DIAGNOSIS — Z12.39 SCREENING FOR BREAST CANCER: ICD-10-CM

## 2019-11-27 LAB
BASOPHILS # BLD AUTO: 0 10E9/L (ref 0–0.2)
BASOPHILS NFR BLD AUTO: 0.2 %
CHOLEST SERPL-MCNC: 254 MG/DL
DIFFERENTIAL METHOD BLD: ABNORMAL
EOSINOPHIL # BLD AUTO: 0.1 10E9/L (ref 0–0.7)
EOSINOPHIL NFR BLD AUTO: 1 %
ERYTHROCYTE [DISTWIDTH] IN BLOOD BY AUTOMATED COUNT: 12.5 % (ref 10–15)
GLUCOSE SERPL-MCNC: 98 MG/DL (ref 70–99)
HCT VFR BLD AUTO: 41.9 % (ref 35–47)
HDLC SERPL-MCNC: 127 MG/DL
HGB BLD-MCNC: 14.1 G/DL (ref 11.7–15.7)
LDLC SERPL CALC-MCNC: 114 MG/DL
LYMPHOCYTES # BLD AUTO: 2 10E9/L (ref 0.8–5.3)
LYMPHOCYTES NFR BLD AUTO: 39.2 %
MCH RBC QN AUTO: 31.4 PG (ref 26.5–33)
MCHC RBC AUTO-ENTMCNC: 33.7 G/DL (ref 31.5–36.5)
MCV RBC AUTO: 93 FL (ref 78–100)
MONOCYTES # BLD AUTO: 0.6 10E9/L (ref 0–1.3)
MONOCYTES NFR BLD AUTO: 11.1 %
NEUTROPHILS # BLD AUTO: 2.4 10E9/L (ref 1.6–8.3)
NEUTROPHILS NFR BLD AUTO: 48.5 %
NONHDLC SERPL-MCNC: 127 MG/DL
PLATELET # BLD AUTO: 149 10E9/L (ref 150–450)
RBC # BLD AUTO: 4.49 10E12/L (ref 3.8–5.2)
TRIGL SERPL-MCNC: 63 MG/DL
WBC # BLD AUTO: 5 10E9/L (ref 4–11)

## 2019-11-27 PROCEDURE — 80061 LIPID PANEL: CPT | Performed by: FAMILY MEDICINE

## 2019-11-27 PROCEDURE — 99396 PREV VISIT EST AGE 40-64: CPT | Mod: 25 | Performed by: FAMILY MEDICINE

## 2019-11-27 PROCEDURE — 90471 IMMUNIZATION ADMIN: CPT | Performed by: FAMILY MEDICINE

## 2019-11-27 PROCEDURE — 36415 COLL VENOUS BLD VENIPUNCTURE: CPT | Performed by: FAMILY MEDICINE

## 2019-11-27 PROCEDURE — 90686 IIV4 VACC NO PRSV 0.5 ML IM: CPT | Performed by: FAMILY MEDICINE

## 2019-11-27 PROCEDURE — 80053 COMPREHEN METABOLIC PANEL: CPT | Performed by: FAMILY MEDICINE

## 2019-11-27 PROCEDURE — 85025 COMPLETE CBC W/AUTO DIFF WBC: CPT | Performed by: FAMILY MEDICINE

## 2019-11-27 PROCEDURE — 83735 ASSAY OF MAGNESIUM: CPT | Performed by: FAMILY MEDICINE

## 2019-11-27 PROCEDURE — 84100 ASSAY OF PHOSPHORUS: CPT | Performed by: FAMILY MEDICINE

## 2019-11-27 PROCEDURE — 82947 ASSAY GLUCOSE BLOOD QUANT: CPT | Performed by: FAMILY MEDICINE

## 2019-11-27 RX ORDER — CYCLOBENZAPRINE HCL 10 MG
TABLET ORAL
Qty: 30 TABLET | Refills: 3 | Status: SHIPPED | OUTPATIENT
Start: 2019-11-27

## 2019-11-27 ASSESSMENT — MIFFLIN-ST. JEOR: SCORE: 1087.98

## 2019-11-27 NOTE — PATIENT INSTRUCTIONS
Please call [facility] (formerly called [facility]) at [phone] to schedule mammogram and bone scan.         At [facility], we strive to deliver an exceptional experience to you, every time we see you. If you receive a survey, please complete it as we do value your feedback.  If you have MyChart, you can expect to receive results automatically within 24 hours of their completion.  Your provider will send a note interpreting your results as well.   If you do not have MyChart, you should receive your results in about a week by mail.    Your care team:     Family Medicine   PURVI Fonseca MD Emily Bunt, APRN CNP S. MD Cintia Clark MD Angela Wermerskirchen, MD    Internal Medicine  David Katz MD coming 2020     Clinic hours: Monday - Wednesday 7 am-7 pm   Thursdays and Fridays 7 am-5 pm.     [facility]: Monday - Friday 11 am-9 pm,   Saturday and Sunday 9 am-5 pm.    [facility]: Monday -Thursday 8 am-8 pm; Friday 8 am-6 pm; Saturday and Sunday 9 am-5 pm.     [facility]: Monday - Thursday 8 am - 7 pm; Friday 8 am - 6 pm    Clinic: [phone]   [facility]: [phone]   [facility]: [phone]            Preventive Health Recommendations  Female Ages 50 - 64    Yearly exam: See your health care provider every year in order to  o Review health changes.   o Discuss preventive care.    o Review your medicines if your doctor has prescribed any.      Get a Pap test every three years (unless you have an abnormal result and your provider advises testing more often).    If you get Pap tests with HPV test, you only need to test every 5 years, unless you have an abnormal result.     You do not need a Pap test if your uterus was removed (hysterectomy) and you have not had cancer.    You  should be tested each year for STDs (sexually transmitted diseases) if you're at risk.     Have a mammogram every 1 to 2 years.    Have a colonoscopy at age 50, or have a yearly FIT test (stool test). These exams screen for colon cancer.      Have a cholesterol test every 5 years, or more often if advised.    Have a diabetes test (fasting glucose) every three years. If you are at risk for diabetes, you should have this test more often.     If you are at risk for osteoporosis (brittle bone disease), think about having a bone density scan (DEXA).    Shots: Get a flu shot each year. Get a tetanus shot every 10 years.    Nutrition:     Eat at least 5 servings of fruits and vegetables each day.    Eat whole-grain bread, whole-wheat pasta and brown rice instead of white grains and rice.    Get adequate Calcium and Vitamin D.     Lifestyle    Exercise at least 150 minutes a week (30 minutes a day, 5 days a week). This will help you control your weight and prevent disease.    Limit alcohol to one drink per day.    No smoking.     Wear sunscreen to prevent skin cancer.     See your dentist every six months for an exam and cleaning.    See your eye doctor every 1 to 2 years.
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
## 2019-11-29 LAB
ALBUMIN SERPL-MCNC: 4.2 G/DL (ref 3.4–5)
ALP SERPL-CCNC: 40 U/L (ref 40–150)
ALT SERPL W P-5'-P-CCNC: 22 U/L (ref 0–50)
ANION GAP SERPL CALCULATED.3IONS-SCNC: 4 MMOL/L (ref 3–14)
AST SERPL W P-5'-P-CCNC: 22 U/L (ref 0–45)
BILIRUB SERPL-MCNC: 0.6 MG/DL (ref 0.2–1.3)
BUN SERPL-MCNC: 13 MG/DL (ref 7–30)
CALCIUM SERPL-MCNC: 9 MG/DL (ref 8.5–10.1)
CHLORIDE SERPL-SCNC: 108 MMOL/L (ref 94–109)
CO2 SERPL-SCNC: 30 MMOL/L (ref 20–32)
CREAT SERPL-MCNC: 0.72 MG/DL (ref 0.52–1.04)
GFR SERPL CREATININE-BSD FRML MDRD: >90 ML/MIN/{1.73_M2}
GLUCOSE SERPL-MCNC: 96 MG/DL (ref 70–99)
MAGNESIUM SERPL-MCNC: 2 MG/DL (ref 1.6–2.3)
PHOSPHATE SERPL-MCNC: 3.6 MG/DL (ref 2.5–4.5)
POTASSIUM SERPL-SCNC: 4.4 MMOL/L (ref 3.4–5.3)
PROT SERPL-MCNC: 6.4 G/DL (ref 6.8–8.8)
SODIUM SERPL-SCNC: 142 MMOL/L (ref 133–144)

## 2019-12-18 DIAGNOSIS — Z87.828 HISTORY OF BICYCLE ACCIDENT: ICD-10-CM

## 2019-12-18 NOTE — PROGRESS NOTES
12/23/2019    Referring Provider: Hannah Valles MD    Presenting Information:   I met with Elizabeth Catalan today for genetic counseling at the [facility] at the [facility] to discuss her personal history of melanoma and lymphoma as well as her family history of breast, stomach, colon, kidney, and pancreatic cancer.  She is here today to review this history, cancer screening recommendations, and available genetic testing options.    Personal History:  Elizabeth is a 59 year old female. She was diagnosed with malignant melanoma on her right shoulder on 10/30/2017; treatment included excision.  She also has a history of B-cell lymphoma in 2014; treatment included an autologous bone marrow transplant in 2015 and chemotherapy. She is currently in remission.       She had her first menstrual period at age 12, her first child at age 25, and is post-menopausal.  Elizabeth has her ovaries, fallopian tubes and uterus in place, and she has not had ovarian cancer screening to date. She had a uterine ablation by report in 2009.  She reports that she has not had hormone replacement therapy.      She has had clinical breast exams and mammograms; her most recent mammogram on 12/20/2019 is still pending. A previous mammogram in 11/2018 was normal; her breast tissue was categorized as scattered fibroglandular densities. 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 A left breast biopsy in 11/12/1997 was benign.    Her most recent colonoscopy on 3/02/2011 was normal and follow-up was recommended in 10 years. Other than dermatological exams, she does not regularly do any other cancer screening at this time.     Family History: (Please see scanned pedigree for detailed family history information)    Her sister had melanoma at 28 and breast cancer at 53; she is currently 57.  Her son, Elizabeth's nephew, had an astrocytoma at age 1.     Her brother had melanoma at 30.     Her mother had kidney cancer at about 75-80; she is currently 85.    Her maternal aunt had breast cancer at 55 and a carcinoid in an unknown location at 65. She passed away at 70. Her son, Elizabeth's maternal first cousin, had colon cancer at 57; he is currently 59.    Her maternal grandmother had pancreatic cancer at 63 and passed away soon after.     Her father had myeloma at 58; he passed away at 59.     Her paternal aunt had breast cancer at 75-80; she is currently 90.    Her paternal uncle had lung cancer at 63; he passed away at 65.    Her paternal first cousin had melanoma at 26 and passed away at 27. This cousin had a brother who had stomach cancer and passed away at 57.    Her paternal grandmother had breast cancer at 83; she passed away at 91.     Her maternal ethnicity is [place]. Her paternal ethnicity is Thai and English.  There is no known Ashkenazi Jehovah's witness ancestry on either side of her family. There is no reported consanguinity.    Discussion:    Elizabeth's personal and family history of melanoma, breast, colon, kidney, stomach and pancreatic cancer is suggestive of a hereditary cancer syndrome.    We reviewed the features of sporadic, familial, and hereditary cancers. In looking at Elizabeth's family history, it is possible that a cancer susceptibility gene is present due to her and her siblings' melanoma, her cousin's melanoma, her sister and aunt's breast cancer , her  Mother's kidney cancer, her cousin's colon cancer, her other cousin's stomach cancer and her grandmother's pancreatic cancer.     We discussed that myeloma, lymphoma and lung cancer are typically considered multifactorial in which a combination of genetic changes and the environment can influence the development of these types of cancers. Genetic testing for these types of cancer are not comprehensive but Nancy is encouraged to contact me in the  future in the event that targeted genetic testing is developed for these types of cancers.   Due to the melanoma, breast and pancreatic cancer, we discussed the natural history and genetics of Hereditary Breast and Ovarian Cancer (HBOC), which is caused by a mutation in the BRCA1 or BRCA2 gene. HBOC typically presents with multiple family members diagnosed with breast cancer before age 50 and/or ovarian cancer. Other cancer risks associated with HBOC include male breast cancer, prostate cancer, pancreatic cancer, and melanoma.  Based on her personal and family history, Elizabeth meets current National Comprehensive Cancer Network (NCCN) criteria for genetic testing of BRCA1/2.    Because of the pancreatic and colon cancer on her maternal side and possibly the stomach cancer in her paternal side, we also discussed Rodney syndrome, which can be caused by a mutation in one of five genes:  MLH1, MSH2, MSH6, PMS2, and EPCAM. A single mutation in one of the Rodney Syndrome genes increases the risk for several cancers, such as colon, endometrial, ovarian, and stomach.  Other cancers that can be seen in some families with Rodney Syndrome include pancreatic, bladder, biliary tract, urothelial, small bowel, prostate, breast and brain cancers.  We discussed Familial Atypical Multiple Mole Melanoma (FAMMM) syndrome, which is caused by a mutation in one copy of the CDKN2A and CDK4 gene. It is characterized by multiple melanocytic nevi and a family history of melanoma. The likelihood of developing melanoma varies by geographic location, and has been estimated to be 50-75% in the [place]. CDKN2A are also associated with an increased risk for pancreatic cancer, among others.  The likelihood of developing pancreatic cancer has been estimated to be approximately 17%; in some families, though, this risk may be higher.   PTEN hamartoma tumor syndrome (PHTS) is caused by mutations in one copy of the PTEN gene. It is characterized by  increased risk for breast, thyroid (especially follicular type), endometrial and other cancers as well as macrocephaly and intellectual disability. Melanoma and other cutaneous features such as trichilemmoma, facial papules and palmoplantar keratoses can be seen as well.   We briefly discussed other genes that have associated risks for developing melanoma:   CDK4: Individuals with a mutation in one copy of their CDK4 gene have familial cutaneous malignant melanoma. They are at an increased risk for developing atypical moles and melanoma, though the exact lifetime risk has not yet been defined.   MITF: Individuals with the [id number] mutation in the MITF gene have an elevated risk for melanoma. This risk has been estimated to be nearly 3 times higher than the general population risk. Other mutations in the MITF are not currently known to be associated with this increased risk.  RB1: Retinoblastoma is caused by mutations in one copy of the RB1 gene. It is characterized by a significantly increased risk for malignant tumors in the retina, typically found in young children. Melanomas, osteosarcomas, and soft tissues sarcoma can also be seen in affected individuals, though the exact lifetime risks are unknown.   TP53: Mutations in one copy of the TP53 gene cause Li-Fraumeni syndrome (LFS). LFS is associated with the development of many different types of cancer (i.e. sarcomas, adrenocortical tumors, breast cancer) beginning in childhood or young adulthood. Increased risk for melanoma has also been reported in families with LFS.     A detailed handout regarding HBOC, Rodney syndrome, melanoma-related syndromes and mutations, and the information we discussed was provided to Elizabeth at the end of our appointment today and can be found in the after visit summary. Topics included: inheritance pattern, cancer risks, cancer screening recommendations, and also risks, benefits and limitations of testing.    We discussed that there are  additional genes that could cause increased risk for melanoma, breast, colon, kidney, stomach and pancreatic cancer. As many of these genes present with overlapping features in a family and accurate cancer risk cannot always be established based upon the pedigree analysis alone, it would be reasonable for Elizabeth to consider panel genetic testing to analyze multiple genes at once.    We reviewed genetic testing options for the cancers seen in her family history that suggest a hereditary cause: an actionable high/moderate risk gene custom panel and an expanded high and moderate risk custom panel. Elizabeth expressed an interest in learning as much information as possible from the testing. She opted for the expanded panel.  Genetic testing is available for 67 genes associated with increased risk for many different cancers: CancerNext-Expanded (AIP, ALK, APC, BEVERLY, BAP1, BARD1, BLM, BRCA1, BRCA2, BRIP1, BMPR1A, CDH1, CDK4, CDKN1B, CDKN2A, CHEK2, DICER1, EPCAM, FANCC, FH, FLCN, GALNT12, GREM1, HOXB13, MAX, MEN1, MET, MITF, MLH1, MRE11A, MSH2, MSH6, MUTYH, NBN, NF1, NF2, PALB2, PHOX2B, PMS2, POLD1, POLE, POT1, [id number], PTCH1, PTEN, RAD50, RAD51C, RAD51D, RB1, RET, SDHA, SDHAF2, SDHB, SDHC, SDHD, SMAD4, SMARCA4, SMARCB1, SMARCE1, STK11, SUFU, QVXU486, TP53, TSC1, TSC2, VHL, and XRCC2).  We discussed that many of the genes in the CancerNext-Expanded panel are associated with specific hereditary cancer syndromes and have published management guidelines:  Hereditary Breast and Ovarian Cancer syndrome (BRCA1, BRCA2), Rodney syndrome (MLH1, MSH2, MSH6, PMS2, EPCAM), Familial Adenomatous Polyposis (APC), Hereditary Diffuse Gastric Cancer (CDH1), Familial Atypical Multiple Mole Melanoma syndrome (CDK4, CDKN2A), Juvenile Polyposis syndrome (BMPR1A, SMAD4), Cowden syndrome (PTEN), Li Fraumeni syndrome (TP53), Peutz-Jeghers syndrome (STK11), MUTYH Associated Polyposis (MUTYH), Hereditary Leiomyomatosis and Renal Cell Cancer (FH),  [employer] (FLCN), Hereditary Papillary Renal Carcinoma (MET), Hereditary Paraganglioma and Pheochromocytoma syndrome (SDHA, SDHAF2, SDHB, SDHC, SDHD), Multiple Endocrine Neoplasia type 2 (RET), Tuberous sclerosis complex (TSC1, TSC2), Von Hippel-Lindau disease (VHL), Neurofibromatosis type 1 (NF1), Neurofibromatosis type 2 (NF1), Multiple Endocrine Neoplasia type 1 (MEN1), Multiple Endocrine Neoplasia type 4 (CDKN1B), Gorlin syndrome (SUFU, PTCH1), and Bailey complex (MSDBA5R).  The BEVERLY, BRIP1, CHEK2, GREM1, NBN, PALB2, POLD1, POLE, RAD51C, and RAD51D genes are associated with increased cancer risk and have published management guidelines for certain cancers.    The remaining genes (AIP, ALK, BAP1, BARD1, BLM, DICER1, FANCC, GALNT12, HOXB13, MRE11A, MAX, MET, MITF, PHOX2B, POT1, RAD50, SMARCA4, SMARCB1, SMARCE1, [id number], and XRCC2) are associated with increased cancer risk and may allow us to make medical recommendations when mutations are identified.    Elizabeth was provided with a detailed brochure from [facility] explaining the CancerNext-Expanded testing.  Consent was obtained and genetic testing for CancerNext-Expanded was sent to [facility]. Turn around time: 4-6 weeks.     Medical Management: For Elizabeth, we reviewed that the information from genetic testing may determine:    additional cancer screening for which Elizabeth may qualify (i.e., mammogram and breast MRI, more frequent colonoscopies, more frequent dermatologic exams, etc.),    options for risk reducing surgeries Elizabeth could consider (i.e., bilateral mastectomy, surgery to remove her ovaries and/or uterus, etc.),      and targeted chemotherapies for Elizabeth if she were to develop certain cancers in the future (i.e. immunotherapy for individuals with Rodney syndrome, PARP inhibitors, etc.).     These recommendations and possible targeted chemotherapies will be discussed in detail once genetic testing is completed.   Plan:  1) Today Elizabeth  elected to proceed with genetic testing using the CancerNext-Expanded panel offered by [facility].  2) This information should be available in 4-6 weeks.  3) Elizabeth will schedule a telephone call to discuss the results.    Face to face time: 45 minutes    Sydni Lerma MS, [facility]  Licensed genetic counselor  [phone]

## 2019-12-18 NOTE — TELEPHONE ENCOUNTER
Requested Prescriptions   Pending Prescriptions Disp Refills     cyclobenzaprine (FLEXERIL) 10 MG tablet 30 tablet 3     Sig: TAKE 1 TABLET BY MOUTH EVERY EVENING AS NEEDED FOR MUSCLE SPASMS. Needs office visit prior to further refill       There is no refill protocol information for this order          cyclobenzaprine (FLEXERIL) 10 MG tablet      Last Written Prescription Date:  11/27/19  Last Fill Quantity: 30,   # refills: 3  Last Office Visit: 11/27/19  Future Office visit:       Routing refill request to provider for review/approval because:  Drug not on the [facility], P or [facility] refill protocol or controlled substance

## 2019-12-18 NOTE — TELEPHONE ENCOUNTER
Reason for Call:  Other prescription    Detailed comments: Pt states she would like to request the following medication for refill. Thank you.    cyclobenzaprine (FLEXERIL) 10 MG tablet    [facility] [address] [place] [address]  [phone]    Phone Number Patient can be reached at: Home number on file [phone] (home)    Best Time: Any    Can we leave a detailed message on this number? YES    Call taken on 12/18/2019 at 2:08 PM by Saima Dewitt

## 2019-12-19 RX ORDER — CYCLOBENZAPRINE HCL 10 MG
TABLET ORAL
Qty: 30 TABLET | Refills: 3
Start: 2019-12-19

## 2019-12-19 NOTE — TELEPHONE ENCOUNTER
30 day supply with 3 refills sent on 11/27/19. Should have refills on file at pharmacy. Too soon to refill.         Nadir Sheets RN, BSN, PHN

## 2019-12-20 ENCOUNTER — ANCILLARY PROCEDURE (OUTPATIENT)
Dept: MAMMOGRAPHY | Facility: CLINIC | Age: 59
End: 2019-12-20
Attending: FAMILY MEDICINE
Payer: COMMERCIAL

## 2019-12-20 ENCOUNTER — ANCILLARY PROCEDURE (OUTPATIENT)
Dept: BONE DENSITY | Facility: CLINIC | Age: 59
End: 2019-12-20
Attending: FAMILY MEDICINE
Payer: COMMERCIAL

## 2019-12-20 DIAGNOSIS — Z12.39 SCREENING FOR BREAST CANCER: ICD-10-CM

## 2019-12-20 DIAGNOSIS — E28.39 ESTROGEN DEFICIENCY: ICD-10-CM

## 2019-12-20 PROCEDURE — 77080 DXA BONE DENSITY AXIAL: CPT | Performed by: RADIOLOGY

## 2019-12-20 PROCEDURE — 77063 BREAST TOMOSYNTHESIS BI: CPT

## 2019-12-20 PROCEDURE — 77067 SCR MAMMO BI INCL CAD: CPT

## 2019-12-23 ENCOUNTER — ONCOLOGY VISIT (OUTPATIENT)
Dept: ONCOLOGY | Facility: CLINIC | Age: 59
End: 2019-12-23
Attending: GENETIC COUNSELOR, MS
Payer: COMMERCIAL

## 2019-12-23 ENCOUNTER — PRE VISIT (OUTPATIENT)
Dept: ONCOLOGY | Facility: CLINIC | Age: 59
End: 2019-12-23

## 2019-12-23 DIAGNOSIS — C85.80 LYMPHOMA MALIGNANT, LARGE CELL (H): ICD-10-CM

## 2019-12-23 DIAGNOSIS — Z80.8 FAMILY HISTORY OF MELANOMA: ICD-10-CM

## 2019-12-23 DIAGNOSIS — Z80.51 FAMILY HISTORY OF KIDNEY CANCER: ICD-10-CM

## 2019-12-23 DIAGNOSIS — C43.61 MALIGNANT MELANOMA OF SKIN OF UPPER LIMB, INCLUDING SHOULDER, RIGHT (H): Primary | ICD-10-CM

## 2019-12-23 DIAGNOSIS — Z80.0 FAMILY HISTORY OF COLON CANCER: ICD-10-CM

## 2019-12-23 DIAGNOSIS — Z80.0 FAMILY HISTORY OF PANCREATIC CANCER: ICD-10-CM

## 2019-12-23 DIAGNOSIS — Z80.3 FAMILY HISTORY OF MALIGNANT NEOPLASM OF BREAST: ICD-10-CM

## 2019-12-23 DIAGNOSIS — Z80.0 FAMILY HISTORY OF STOMACH CANCER: ICD-10-CM

## 2019-12-23 LAB — MISCELLANEOUS TEST: NORMAL

## 2019-12-23 PROCEDURE — 96040 ZZH GENETIC COUNSELING, EACH 30 MINUTES: CPT | Performed by: GENETIC COUNSELOR, MS

## 2019-12-23 NOTE — LETTER
Cancer Risk Management  Program Locations    [facility]  Mailing Address  Cancer Risk Management Program  [facility]  [address]  [place] [address]    New patient appointments  [phone]  December 24, 2019    Nancy Catalan  [address] [address]      Dear Nancy,    It was a pleasure meeting with you at the [facility] on 12/23/2019.  Here is a copy of the progress note from your recent genetic counseling visit to the Cancer Risk Management Program.  If you have any additional questions, please feel free to call.    Referring Provider: Hannah Valles MD    Presenting Information:   I met with Elizabeth Catalan today for genetic counseling at the Cancer Risk Management Program at the [facility] to discuss her personal history of melanoma and lymphoma as well as her family history of breast, stomach, colon, kidney, and pancreatic cancer.  She is here today to review this history, cancer screening recommendations, and available genetic testing options.    Personal History:  Elizabeth is a 59 year old female. She was diagnosed with malignant melanoma on her right shoulder on 10/30/2017; treatment included excision.  She also has a history of B-cell lymphoma in 2014; treatment included an autologous bone marrow transplant in 2015 and chemotherapy. She is currently in remission.       She had her first menstrual period at age 12, her first child at age 25, and is post-menopausal.  Elizabeth has her ovaries, fallopian tubes and uterus in place, and she has not had ovarian cancer screening to date. She had a uterine ablation by report in 2009.  She reports that she has not had hormone replacement therapy.      She has had clinical breast exams and mammograms; her most  recent mammogram on 12/20/2019 is still pending. A previous mammogram in 11/2018 was normal; her breast tissue was categorized as scattered fibroglandular densities. 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 A left breast biopsy in 11/12/1997 was benign.    Her most recent colonoscopy on 3/02/2011 was normal and follow-up was recommended in 10 years. Other than dermatological exams, she does not regularly do any other cancer screening at this time.     Family History: (Please see scanned pedigree for detailed family history information)    Her sister had melanoma at 28 and breast cancer at 53; she is currently 57. Her son, Elizabeth's nephew, had an astrocytoma at age 1.     Her brother had melanoma at 30.     Her mother had kidney cancer at about 75-80; she is currently 85.    Her maternal aunt had breast cancer at 55 and a carcinoid in an unknown location at 65. She passed away at 70. Her son, Elizabeth's maternal first cousin, had colon cancer at 57; he is currently 59.    Her maternal grandmother had pancreatic cancer at 63 and passed away soon after.     Her father had myeloma at 58; he passed away at 59.     Her paternal aunt had breast cancer at 75-80; she is currently 90.    Her paternal uncle had lung cancer at 63; he passed away at 65.    Her paternal first cousin had melanoma at 26 and passed away at 27. This cousin had a brother who had stomach cancer and passed away at 57.    Her paternal grandmother had breast cancer at 83; she passed away at 91.     Her maternal ethnicity is Malaysian. Her paternal ethnicity is Somali and English.  There is no known Ashkenazi Mu-ism ancestry on either side of her family. There is no reported consanguinity.    Discussion:    Elizabeth's personal and family history of melanoma, breast, colon, kidney, stomach and pancreatic cancer is suggestive  of a hereditary cancer syndrome.    We reviewed the features of sporadic, familial, and hereditary cancers. In looking at Elizabeth's family history, it is possible that a cancer susceptibility gene is present due to her and her siblings' melanoma, her cousin's melanoma, her sister and aunt's breast cancer , her  Mother's kidney cancer, her cousin's colon cancer, her other cousin's stomach cancer and her grandmother's pancreatic cancer.     We discussed that myeloma, lymphoma and lung cancer are typically considered multifactorial in which a combination of genetic changes and the environment can influence the development of these types of cancers. Genetic testing for these types of cancer are not comprehensive but Nancy is encouraged to contact me in the future in the event that targeted genetic testing is developed for these types of cancers.   Due to the melanoma, breast and pancreatic cancer, we discussed the natural history and genetics of Hereditary Breast and Ovarian Cancer (HBOC), which is caused by a mutation in the BRCA1 or BRCA2 gene. HBOC typically presents with multiple family members diagnosed with breast cancer before age 50 and/or ovarian cancer. Other cancer risks associated with HBOC include male breast cancer, prostate cancer, pancreatic cancer, and melanoma.   Based on her personal and family history, Elizabeth meets current National Comprehensive Cancer Network (NCCN) criteria for genetic testing of BRCA1/2.    Because of the pancreatic and colon cancer on her maternal side and possibly the stomach cancer in her paternal side, we also discussed Rodney syndrome, which can be caused by a mutation in one of five genes:  MLH1, MSH2, MSH6, PMS2, and EPCAM. A single mutation in one of the Rodney Syndrome genes increases the risk for several cancers, such as colon, endometrial, ovarian, and stomach.  Other cancers that can be seen in some families with Rodney Syndrome include pancreatic, bladder, biliary tract,  urothelial, small bowel, prostate, breast and brain cancers.  We discussed Familial Atypical Multiple Mole Melanoma (FAMMM) syndrome, which is caused by a mutation in one copy of the CDKN2A and CDK4 gene. It is characterized by multiple melanocytic nevi and a family history of melanoma. The likelihood of developing melanoma varies by geographic location, and has been estimated to be 50-75% in the [place]. CDKN2A are also associated with an increased risk for pancreatic cancer, among others.  The likelihood of developing pancreatic cancer has been estimated to be approximately 17%; in some families, though, this risk may be higher.   PTEN hamartoma tumor syndrome (PHTS) is caused by mutations in one copy of the PTEN gene. It is characterized by increased risk for breast, thyroid (especially follicular type), endometrial and other cancers as well as macrocephaly and intellectual disability. Melanoma and other cutaneous features such as trichilemmoma, facial papules and palmoplantar keratoses can be seen as well.   We briefly discussed other genes that have associated risks for developing melanoma:   CDK4: Individuals with a mutation in one copy of their CDK4 gene have familial cutaneous malignant melanoma. They are at an increased risk for developing atypical moles and melanoma, though the exact lifetime risk has not yet been defined.   MITF: Individuals with the [id number] mutation in the MITF gene have an elevated risk for melanoma. This risk has been estimated to be nearly 3 times higher than the general population risk. Other mutations in the MITF are not currently known to be associated with this increased risk.  RB1: Retinoblastoma is caused by mutations in one copy of the RB1 gene. It is characterized by a significantly increased risk for malignant tumors in the retina, typically found in young children. Melanomas, osteosarcomas, and soft tissues sarcoma can also be seen in affected individuals, though the  exact lifetime risks are unknown.   TP53: Mutations in one copy of the TP53 gene cause Li-Fraumeni syndrome (LFS). LFS is associated with the development of many different types of cancer (i.e. sarcomas, adrenocortical tumors, breast cancer) beginning in childhood or young adulthood. Increased risk for melanoma has also been reported in families with LFS.     A detailed handout regarding HBOC, Rodney syndrome, melanoma-related syndromes and mutations, and the information we discussed was provided to Elizabeth at the end of our appointment today and can be found in the after visit summary. Topics included: inheritance pattern, cancer risks, cancer screening recommendations, and also risks, benefits and limitations of testing.    We discussed that there are additional genes that could cause increased risk for melanoma, breast, colon, kidney, stomach and pancreatic cancer. As many of these genes present with overlapping features in a family and accurate cancer risk cannot always be established based upon the pedigree analysis alone, it would be reasonable for Elizabeth to consider panel genetic testing to analyze multiple genes at once.    We reviewed genetic testing options for the cancers seen in her family history that suggest a hereditary cause: an actionable high/moderate risk gene custom panel and an expanded high and moderate risk custom panel. Elizabeth expressed an interest in learning as much information as possible from the testing. She opted for the expanded panel.  Genetic testing is available for 67 genes associated with increased risk for many different cancers: CancerNext-Expanded (AIP, ALK, APC, BEVERLY, BAP1, BARD1, BLM, BRCA1, BRCA2, BRIP1, BMPR1A, CDH1, CDK4, CDKN1B, CDKN2A, CHEK2, DICER1, EPCAM, FANCC, FH, FLCN, GALNT12, GREM1, HOXB13, MAX, MEN1, MET, MITF, MLH1, MRE11A, MSH2, MSH6, MUTYH, NBN, NF1, NF2, PALB2, PHOX2B, PMS2, POLD1, POLE, POT1, PHCQE4L, PTCH1, PTEN, RAD50, RAD51C, RAD51D, RB1, RET, SDHA, SDHAF2,  SDHB, SDHC, SDHD, SMAD4, SMARCA4, SMARCB1, SMARCE1, STK11, SUFU, [id number], TP53, TSC1, TSC2, VHL, and XRCC2).  We discussed that many of the genes in the CancerNext-Expanded panel are associated with specific hereditary cancer syndromes and have published management guidelines:  Hereditary Breast and Ovarian Cancer syndrome (BRCA1, BRCA2), Rodney syndrome (MLH1, MSH2, MSH6, PMS2, EPCAM), Familial Adenomatous Polyposis (APC), Hereditary Diffuse Gastric Cancer (CDH1), Familial Atypical Multiple Mole Melanoma syndrome (CDK4, CDKN2A), Juvenile Polyposis syndrome (BMPR1A, SMAD4), Cowden syndrome (PTEN), Li Fraumeni syndrome (TP53), Peutz-Jeghers syndrome (STK11), MUTYH Associated Polyposis (MUTYH), Hereditary Leiomyomatosis and Renal Cell Cancer (FH), Vyhk-Ugvu-Yfje (FLCN), Hereditary Papillary Renal Carcinoma (MET), Hereditary Paraganglioma and Pheochromocytoma syndrome (SDHA, SDHAF2, SDHB, SDHC, SDHD), Multiple Endocrine Neoplasia type 2 (RET), Tuberous sclerosis complex (TSC1, TSC2), Von Hippel-Lindau disease (VHL), Neurofibromatosis type 1 (NF1), Neurofibromatosis type 2 (NF1), Multiple Endocrine Neoplasia type 1 (MEN1), Multiple Endocrine Neoplasia type 4 (CDKN1B), Gorlin syndrome (SUFU, PTCH1), and Bailey complex (RXJUK2A).  The BEVERLY, BRIP1, CHEK2, GREM1, NBN, PALB2, POLD1, POLE, RAD51C, and RAD51D genes are associated with increased cancer risk and have published management guidelines for certain cancers.    The remaining genes (AIP, ALK, BAP1, BARD1, BLM, DICER1, FANCC, GALNT12, HOXB13, MRE11A, MAX, MET, MITF, PHOX2B, POT1, RAD50, SMARCA4, SMARCB1, SMARCE1, [id number], and XRCC2) are associated with increased cancer risk and may allow us to make medical recommendations when mutations are identified.    Elizabeth was provided with a detailed brochure from [facility] explaining the CancerNext-Expanded testing.  Consent was obtained and genetic testing for CancerNext-Expanded was sent to [facility]. Turn  around time: 4-6 weeks.     Medical Management: For  Elizabeth, we reviewed that the information from genetic testing may determine:    additional cancer screening for which Elizabeth may qualify (i.e., mammogram and breast MRI, more frequent colonoscopies, more frequent dermatologic exams, etc.),    options for risk reducing surgeries Elizabeth could consider (i.e., bilateral mastectomy, surgery to remove her ovaries and/or uterus, etc.),      and targeted chemotherapies for Elizabeth if she were to develop certain cancers in the future (i.e. immunotherapy for individuals with Rodney syndrome, PARP inhibitors, etc.).     These recommendations and possible targeted chemotherapies will be discussed in detail once genetic testing is completed.   Plan:  1) Today Elizabeth elected to proceed with genetic testing using the CancerNext-Expanded panel offered by [facility].  2) This information should be available in 4-6 weeks.  3) Elizabeth will schedule a telephone call to discuss the results.    Sydni Lerma MS, [facility]  Licensed genetic counselor  [phone]

## 2019-12-23 NOTE — PATIENT INSTRUCTIONS
Assessing Cancer Risk  Only about 5-10% of cancers are thought to be due to an inherited cancer susceptibility gene.    These families often have:    Several people with the same or related types of cancer    Cancers diagnosed at a young age (before age 50)    Individuals with more than one primary cancer    Multiple generations of the family affected with cancer    Melanoma Genes  We each inherit two copies of every gene in our bodies: one from our mother, and one from our father.  Each gene has a specific job to do.  When a gene has a mistake or  mutation  in it, it does not work like it should.  When someone inherits a mutation in a melanoma gene, this increases their risk to develop melanoma above that of the general population risk (about 1.6% lifetime risk).  Inheriting a gene mutation does not guarantee that a person will develop melanoma or other associated cancers, but it does significantly increase his or her risk above that of the general population     Below is a list of genes commonly associated with melanoma. A single mutation in any of these genes increases the risk for melanoma, among other cancers.     BAP1  BAP1-tumor predisposition syndrome (BAP1-TPDS) is caused by mutations in one copy of the BAP1 gene. It is associated with an increased risk for different skin findings such as atypical Spitz tumors, cutaneous (skin) melanoma, and basal cell carcinoma, as well as other cancers, such as uveal (eye) melanoma, malignant mesothelioma, and kidney cancer. The exact lifetime risks for these cancers are unknown at this time. Other suspected cancer risks include breast cancer, cholangiocarcinoma, non-small cell lung cancer, meningioma and neuroendocrine tumors.       BRCA2  Mutations in one copy of the BRCA2 gene result in Hereditary Breast and Ovarian Cancer (HBOC) syndrome. Individuals with HBOC have elevated breast, ovarian, male breast, prostate, pancreatic and melanoma risks. Risks for both  cutaneous and ocular melanoma may be elevated in some families with a BRCA2 mutation, but not all.     General Population BRCA2 Mutation   Breast 12% 40-80%   Ovarian 1-2% 10-20%   Male Breast <1% 5-10%   Prostate 16% 20%   Pancreatic 2-3% Increased   Melanoma 1.6% Increased     CDK4  Individuals with a mutation in one copy of their CDK4 gene have familial cutaneous malignant melanoma. They are at an increased risk for developing atypical moles and melanoma, though the exact lifetime risk has not yet been defined.     CDKN2A  Mutations in one copy of the CDKN2A gene is associated with Familial Atypical Multiple Mole Melanoma (FAMMM) syndrome. It is characterized by multiple melanocytic nevi and a family history of melanoma. The likelihood of developing melanoma varies by geographic location, and has been estimated to be 50-75% in the [place]. CDKN2A are also associated with an increased risk for pancreatic cancer, among others.  The likelihood of developing pancreatic cancer has been estimated to be approximately 17%; in some families, though, this risk may be higher.    MITF  Individuals with the [id number] mutation in the MITF gene have an elevated risk for melanoma. This risk has been estimated to be nearly 3 times higher than the general population risk. Other mutations in the MITF are not currently known to be associated with this increased risk.    PTEN  PTEN hamartoma tumor syndrome (PHTS) is caused by mutations in one copy of the PTEN gene. It is characterized by increased risk for breast, thyroid (especially follicular type), endometrial and other cancers as well as macrocephaly and intellectual disability. Melanoma and other cutaneous features such as trichilemmoma, facial papules and palmoplantar keratoses can be seen as well.     General Population PTEN Mutation   Breast 12% 60-80%   Thyroid 1-2% 20-40%   Endometrial 2-3% 20-30%   Colon 4.5% 10-20%   Renal 1-2% 10-30%   Melanoma 1.6% 2-6%       RB1  Retinoblastoma is caused by mutations in one copy of the RB1 gene. It is characterized by a significantly increased risk for malignant tumors in the retina, typically found in young children. Melanomas, osteosarcomas, and soft tissues sarcoma can also be seen in affected individuals, though the exact lifetime risks are unknown.     TP53  Mutations in one copy of the TP53 gene cause Li-Fraumeni syndrome (LFS). LFS is associated with the development of many different types of cancer (i.e. sarcomas, adrenocortical tumors, breast cancer) beginning in childhood or young adulthood. Increased risk for melanoma has also been reported in families with LFS.     Some people may be candidates for genetic testing of more than one gene.  For these families, genetic testing using a cancer panel may be offered.  These panels will test different genes known to increase the risk for breast, ovarian, uterine, and/or other cancers. All of the genes discussed below have published clinical management guidelines for individuals who are found to carry a mutation. The purpose of this handout is to serve as a brief summary of the genes analyzed by the panels used to inquire about hereditary breast and gynecologic cancer:  BEVERLY, BRCA1, BRCA2, BRIP1, CDH1, CHEK2, MLH1, MSH2, MSH6, PMS2, EPCAM, PTEN, PALB2, RAD51C, RAD51D, and TP53.  ______________________________________________________________________________  Hereditary Breast and Ovarian Cancer Syndrome   (BRCA1 and BRCA2)  A single mutation in one of the copies of BRCA1 or BRCA2 increases the risk for breast and ovarian cancer, among others.  The risk for pancreatic cancer and melanoma may also be slightly increased in some families.  The chart below shows the chance that someone with a BRCA mutation would develop cancer in his or her lifetime1,2,3,4.        A person s ethnic background is also important to consider, as individuals of Ashkenazi Church ancestry have a higher chance of  having a BRCA gene mutation.  There are three BRCA mutations that occur more frequently in this population.    Rodney Syndrome   (MLH1, MSH2, MSH6, PMS2, and EPCAM)  Currently five genes are known to cause Rodney Syndrome: MLH1, MSH2, MSH6, PMS2, and EPCAM.  A single mutation in one of the Rodney Syndrome genes increases the risk for colon, endometrial, ovarian, and stomach cancers.  Other cancers that occur less commonly in Rodney Syndrome include urinary tract, skin, and brain cancers.  The chart below shows the chance that a person with Rodney syndrome would develop cancer in his or her lifetime5.      *Cancer risk varies depending on Rodney syndrome gene found    Cowden Syndrome   (PTEN)  Cowden syndrome is a hereditary condition that increases the risk for breast, thyroid, endometrial, colon, and kidney cancer.  Cowden syndrome is caused by a mutation in the PTEN gene.  A single mutation in one of the copies of PTEN causes Cowden syndrome and increases cancer risk.  The chart below shows the chance that someone with a PTEN mutation would develop cancer in their lifetime6,7.  Other benign features seen in some individuals with Cowden syndrome include benign skin lesions (facial papules, keratoses, lipomas), learning disability, autism, thyroid nodules, colon polyps, and larger head size.      *One recent study found breast cancer risk to be increased to 85%    Li-Fraumeni Syndrome   (TP53)  Li-Fraumeni Syndrome (LFS) is a cancer predisposition syndrome caused by a mutation in the TP53 gene. A single mutation in one of the copies of TP53 increases the risk for multiple cancers. Individuals with LFS are at an increased risk for developing cancer at a young age. The lifetime risk for development of a LFS-associated cancer is 50% by age 30 and 90% by age 60.   Core Cancers: Sarcomas, Breast, Brain, Lung, Leukemias/Lymphomas, Adrenocortical carcinomas  Other Cancers: Gastrointestinal, Thyroid, Skin,  Genitourinary    Hereditary Diffuse Gastric Cancer   (CDH1)  Currently, one gene is known to cause hereditary diffuse gastric cancer (HDGC): CDH1.  Individuals with HDGC are at increased risk for diffuse gastric cancer and lobular breast cancer. Of people diagnosed with HDGC, 30-50% have a mutation in the CDH1 gene.  This suggests there are likely other genes that may cause HDGC that have not been identified yet.      Lifetime Cancer Risks    General Population HDGC    Diffuse Gastric  <1% ~80%   Breast 12% 39-52%         Additional Genes  BEVERLY  BEVERLY is a moderate-risk breast cancer gene. Women who have a mutation in BEVERLY can have between a 2-4 fold increased risk for breast cancer compared to the general population8. BEVERLY mutations have also been associated with increased risk for pancreatic cancer, however an estimate of this cancer risk is not well understood9. Individuals who inherit two BEVERLY mutations have a condition called ataxia-telangiectasia (AT).  This rare autosomal recessive condition affects the nervous system and immune system, and is associated with progressive cerebellar ataxia beginning in childhood.  Individuals with ataxia-telangiectasia often have a weakened immune system and have an increased risk for childhood cancers.    PALB2  Mutations in PALB2 have been shown to increase the risk of breast cancer up to 33-58% in some families; where individuals fall within this risk range is dependent upon family [id number]. PALB2 mutations have also been associated with increased risk for pancreatic cancer, although this risk has not been quantified yet.  Individuals who inherit two PALB2 mutations--one from their mother and one from their father--have a condition called Fanconi Anemia.  This rare autosomal recessive condition is associated with short stature, developmental delay, bone marrow failure, and increased risk for childhood cancers.    CHEK2   CHEK2 is a moderate-risk breast cancer gene.  Women who  have a mutation in CHEK2 have around a 2-fold increased risk for breast cancer compared to the general population, and this risk may be higher depending upon family history.11,12,13 Mutations in CHEK2 have also been shown to increase the risk of a number of other cancers, including colon and prostate, however these cancer risks are currently not well understood.    BRIP1, RAD51C and RAD51D  Mutations in BRIP1, RAD51C, and RAD51D have been shown to increase the risk of ovarian cancer and possibly female breast cancer as well14,15 .       Lifetime Cancer Risk    General Population BRIP1 RAD51C RAD51D   Ovarian 1-2% ~5-8% ~5-9% ~7-15%         Hereditary Cancer Resources    FORCE: Facing Our Risk of Cancer Empowered facingourrisk.org   Bright Pink bebrightpink.org   Li-Fraumeni Syndrome Association lfsassociation.org   PTEN World PTENworld.InsightETE   No stomach for cancer, Inc. nostomachforcancer.org   Stomach cancer relief network Scrnet.org   Collaborative Group of the Americas on Inherited Colorectal Cancer (CGA) cgaicc.com    Cancer Care cancercare.org   American Cancer Society (ACS) cancer.org   National Cancer [place] (NCI) cancer.gov       Genetic Testing  Genetic testing involves a simple blood test and will look at the genetic information in genes for any harmful mutations that are associated with increased melanoma and other cancer risks. If possible, it is recommended that the person(s) who has had cancer be tested before other family members.  That person will give us the most useful information about whether or not a specific gene is associated with the cancer in the family.     Results  There are three possible results genetic testing:    Positive--a harmful mutation was identified    Negative--no mutation was identified    Variant of unknown significance--a variation in one of the genes was identified, but it is unclear how this impacts cancer risk in the family    Advantages and Disadvantages  There are  advantages and disadvantages to genetic testing of these genes.    Advantages    May clarify your cancer risk    Can help you make medical decisions    May explain the cancers in your family    May give useful information to your family members (if you share your results)    Disadvantages    Possible negative emotional impact of learning about inherited cancer risk    Uncertainty in interpreting a negative test result in some situations    Possible genetic discrimination concerns (see below)    Inheritance   Mutations in genes associated with melanoma are inherited in an autosomal dominant pattern.  This means that if a parent has a mutation, each of his or her children will have a 50% chance of inheriting that same mutation.  Therefore, each child--male or female--would have a 50% chance of being at increased risk for developing cancer.                                            Image obtained from Genetics Home Reference, 2013       Genetic Information Nondiscrimination Act (DUY)  DUY is a federal law that protects individuals from health insurance or employment discrimination based on a genetic test result alone.  Although rare, there are currently no legal protections in terms of life insurance, long term care, or disability insurances.  Visit the National Human Genome Research [place] at Genome.gov/81798199 to learn more.    Reducing Cancer Risk  If a harmful mutation is found in a cancer risk gene, there may be certain screens or preventative surgeries that can be offered. For example, screening recommendations for individuals with a melanoma-associated gene mutation may include dermatology exams, ophthalmology exams, or other screening, depending on the gene mutation identified. This information will be discussed after genetic testing is completed.    If no mutations are found on genetic testing, screening is then recommended based on personal and/or family history of cancer.     Both men and women with a  gene mutation should talk to their doctor or genetic counselor about mutation specific screening as different mutations present with different risk and screening recommendations. In addition, the age at which to start screening may be modified based on family history of young cancer.    Questions to Think About Regarding Genetic Testing    What effect will the test result have on me and my relationship with my family members if I have an inherited gene mutation?  If I don t have a gene mutation?    Should I share my test results, and how will my family react to this news, which may also affect them?    Are my children ready to learn new information that may one day affect their own health?    Resources  Melanoma Research Foundation https://www.melanoma.org/   AIM at Melanoma Foundation https://www.aimatmelanoma.org/   American Cancer Society (ACS) cancer.org   National Cancer [place] (NCI) cancer.gov     Please call us if you have any questions or concerns.   Cancer Risk Management Program [phone] ([phone])  ? Itz Berry, MS, [facility] [phone]  ? Claudette Riojas, MS, [facility]  [phone]  ? Maria E Ro, MS, [facility]  [phone]  ? Sydni Lerma, MS, [facility]  [phone]  ? Radha Novak, MS, [facility] [phone]  ? Racquel March, MS, [facility] [phone]  ? Terra Davin, MS, [facility]  [phone]    References  1. Cassi E, Gabriella P, Josselin K, Guillermo A, Estuardo H. Hereditary Melanoma: Update on Syndromes and Management - Genetics of familial atypical multiple mole melanoma syndrome. Journal of the American Academy of Dermatology. 2016;74(3):395-407. doi:10.1016/j.jaad.2015.08.038.  2. Quan ZELAYA, Landy R, John CM, Martinez-Lombardo MH. Comprehensive review of BAP1 tumor predisposition syndrome with report of two new cases. Clinical genetics. 2016;89(3):285-294. doi:10.1111/cge.27328.  3. Jerad, Óscar, Deng, Sal, Delonte, Kesha, . . . Vinicius. (2013). Cancer Risks for BRCA1 and BRCA2 Mutation Carriers: Results From  Prospective Analysis of EMBRACE. Journal Of The National Cancer [place], 105(11), 812-822.  4. Jina Epstein, Marino Holt, Brett Spicer, Krista Cordova, Luana Castaneda, Harvinder Gee, . . . Lior Garner. (1996). Germline mutations in the p18TJN5e binding domain of CDK4 in familial melanoma. Nature Genetics, 12(1), 97-99.  5. AICHA Hay, JONG Sapp, AICHA Kiran, DAWIT Bhatt, LISETTE Smith, Spatz, A., . . . DAWIT Cardona (2007). BRCA1, BRCA2, TP53, and CDKN2A germline mutations in patients with breast cancer and cutaneous melanoma. Familial Cancer, 6(4), 453-461.  6. JONG Gore, & ANALY Cordova (2012). Genetic alterations of PTEN in human melanoma. Cellular and Molecular Life Sciences, 69(9), 7182-6174.  7. Stephany Morales, Keeley Quick, Elizabeth Segal, Devyn Javier, Joseph Najera, Rosanna Watson, . . . Jessie Valdovinos. (2011). A SUMOylation-defective MITF germline mutation predisposes to melanoma and renal carcinoma. Nature, 480(0890), 94-8.  8. HADLEY Bartlett., JONG Carter, DAWIT Stout, & BAILEY Sharpe. (2008). Identification of [id number] , DFFWQ8K , FGFR3, and RB1 mutations in melanoma by inhibition of nonsense-mediated mRNA decay. Genes, Chromosomes and Cancer, 4712), 7159-4318.  9. ROSA Sotomayor, & Kody C. (2015). Cowden syndrome: Recognizing and managing a not?so?rare hereditary cancer syndrome. Journal of Surgical Oncology, 111(1), 125-130.  10. Prevalence of the E318K MITF germline mutation in Romansh melanoma patients: Associations with histological subtypes and family cancer history. (2013). Pigment Cell & Melanoma Research, 26(2), 259-262.  11. Cassi MARI, Gabriella P, Josselin K, Guillermo A, Estuardo H. Hereditary Melanoma: Update on Syndromes and Management - Genetics of familial atypical multiple mole melanoma syndrome. Journal of the American Academy of Dermatology. 2016;74(3):395-407. doi:10.1016/j.jaad.2015.08.038.  12. Clyde RODRIGUEZ, Fernando KWAN, Trip KG, et al.  Prevalence of CDKN2A mutations in pancreatic cancer patients: implications for genetic counseling.  Journal of Human Genetics. 2011;19(4):472-478. doi:10.1038/ejhg.2010.198.  13. TARA Ha Demenais, Florence, Goldstein, Alisa M., Melissa Cummings Bishop, Julia Newton, Paillerets, Brigitte Bressac-de, . . . Luana Castaneda. (2002). Geographical Variation in the Penetrance of CDKN2A Mutations for Melanoma. Journal of the National Cancer [place], 94(12), 894-903.  14. Tasha, Abdulaziz, Alondra, Brown, Dell, Nova, . . . Guru. (2006). High-risk Melanoma Susceptibility Genes and Pancreatic Cancer, Neural System Tumors, and Uveal Melanoma across GenoMEL. Cancer Research., 66(20), 0966-3712.  15. Roselia A, Merry PDP, Vikash S, Ranjana HUTSON, Jelena JE, Davion JL, Shannon N, Joshua H, Johsharon O, Kaci A, Pasini B, Radice P, Manoukinicole S, Vincenzo DM, Whitman N, Baltazar E, Moshe-Jose David H, Hardin E, Nevaehinski J, Gronwald J, Selin B, Tulinius H, Thorlacius S, Eerola H, Nevnicolelinna H, Damian K, Hernandez OP. Average risks of breast and ovarian cancer associated with BRCA1 or BRCA2 mutations detected in case series unselected for family history: a combined analysis of 222 studies. Am J Hum Yu. 2003;72:1117-30.  16. Tor N, Jenny M, Wang G.  BRCA1 and BRCA2 Hereditary Breast and Ovarian Cancer. Gene Reviews online. 2013.  17. Luis YC, Nicholas S, Gabriela G, Schneider S. Breast cancer risk among male BRCA1 and BRCA2 mutation carriers. J Natl Cancer Inst. 2007;99:1811-4.  18. Sandeep DG, Faina I, Lázaro J, Darren E, Nina ER, Mike F. Risk of breast cancer in male BRCA2 carriers. J Med Yu. 2010;47:710-1.  19. National Comprehensive Cancer Network. Clinical practice guidelines in oncology, colorectal cancer screening. Available online (registration required). 2015.  20. Jono THOMAS, Светлана J, Lily J, Anu GORDON, Yovany MAZARIEGOS, Kody C. Lifetime cancer risks in individuals with germline PTEN  mutations. Clin Cancer Res. 2012;18:400-7.  21. Landy ALMANZAR. Cowden Syndrome: A Critical Review of the Clinical Literature. J Yu . 2009:18:13-27.  22. Dharmesh NJ, Storm D, Rosa S, Trish P, Raciel T, Zackary M, Dmitriy B, Jeyson H, Jesenia R, Barbi K, Milka L, Sandeep DG, Vincenzo D, Vinicius DF, Abiodun MR, The Breast Cancer Susceptibility Collaboration ([place]) & Munira N. BEVERLY mutations that cause ataxia-telangiectasia are breast cancer susceptibility alleles. Nature Genetics. 2006;38:873-875  23. Wan N , Olimpia Y, Mary J, Lizeth L, Lisa GM , Radha ML, Samara S, Álvarez AG, Syngal S, Cynthia ML, Lakhwinder J , Jeri R, Juan SZ, Neo JR, Malvin VE, Young M, Vogelstein B, Perez N, Ketty RH, Bayron KW, and Yuliet AP. BEVERLY mutations in patients with hereditary pancreatic cancer. Cancer Discover. 2012;2:41-46  24. Roselia WATSON, et al. Breast-Cancer Risk in Families with Mutations in PALB2. NEJM. 2014; 371(6):497-506.  25. CHEK2 Breast Cancer Case-Control Consortium. CHEK2*1100delC and susceptibility to breast cancer: A collaborative analysis involving 10,860 breast cancer cases and 9,065 controls from 10 studies. Am J Hum Yu, 74 (2004), pp. 0933-2707  26. Ivett T, Gianluca S, Miguel K, et al. Spectrum of Mutations in BRCA1, BRCA2, CHEK2, and TP53 in Families at High Risk of Breast Cancer. DENZEL. 2006;295(12):9891-2024.   27. Tabatha QUAN, Abril HURTADO, Tyrone NJ, et al. Risk of breast cancer in women with a CHEK2 mutation with and without a family history of breast cancer. J Clin Oncol. 2011;29:4833-1220.  28. Lauro H, Dania E, Mary Jane SJ, et al. Contribution of germline mutations in the RAD51B, RAD51C, and RAD51D genes to ovarian cancer in the population. J Clin Oncol. 2015;33(26):7769-0290. Doi:10.1200/JCO.2015.61.2408.  29. Abram ELI, Reuben WELCH, Vu P, et al. Mutations in BRIP1 confer high risk of ovarian cancer. Joyce Yu. 2011;43(11):4731-7089. doi:10.1038/ng.955.

## 2019-12-23 NOTE — LETTER
12/23/2019         RE: Nancy Catalan  [address] [facility] [address]        Dear Colleague,    Thank you for referring your patient, Nancy Catalan, to the CANCER RISK MANAGEMENT PROGRAM. Please see a copy of my visit note below.    12/23/2019    Referring Provider: Hannah Valles MD    Presenting Information:   I met with Elizabeth Catalan today for genetic counseling at the Cancer Risk Management Program at the [facility] to discuss her personal history of melanoma and lymphoma as well as her family history of breast, stomach, colon, kidney, and pancreatic cancer.  She is here today to review this history, cancer screening recommendations, and available genetic testing options.    Personal History:  Elizabeth is a 59 year old female. She was diagnosed with malignant melanoma on her right shoulder on 10/30/2017; treatment included excision.  She also has a history of B-cell lymphoma in 2014; treatment included an autologous bone marrow transplant in 2015 and chemotherapy. She is currently in remission.       She had her first menstrual period at age 12, her first child at age 25, and is post-menopausal.  Elizabeth has her ovaries, fallopian tubes and uterus in place, and she has not had ovarian cancer screening to date. She had a uterine ablation by report in 2009.  She reports that she has not had hormone replacement therapy.      She has had clinical breast exams and mammograms; her most recent mammogram on 12/20/2019 is still pending. A previous mammogram in 11/2018 was normal; her breast tissue was categorized as scattered fibroglandular densities. 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 A left breast biopsy in 11/12/1997 was benign.    Her most recent colonoscopy on 3/02/2011 was normal and follow-up was recommended in 10 years. Other than  dermatological exams, she does not regularly do any other cancer screening at this time.     Family History: (Please see scanned pedigree for detailed family history information)    Her sister had melanoma at 28 and breast cancer at 53; she is currently 57. Her son, Elizabeth's nephew, had an astrocytoma at age 1.     Her brother had melanoma at 30.     Her mother had kidney cancer at about 75-80; she is currently 85.    Her maternal aunt had breast cancer at 55 and a carcinoid in an unknown location at 65. She passed away at 70. Her son, Elizabeth's maternal first cousin, had colon cancer at 57; he is currently 59.    Her maternal grandmother had pancreatic cancer at 63 and passed away soon after.     Her father had myeloma at 58; he passed away at 59.     Her paternal aunt had breast cancer at 75-80; she is currently 90.    Her paternal uncle had lung cancer at 63; he passed away at 65.    Her paternal first cousin had melanoma at 26 and passed away at 27. This cousin had a brother who had stomach cancer and passed away at 57.    Her paternal grandmother had breast cancer at 83; she passed away at 91.     Her maternal ethnicity is Congolese. Her paternal ethnicity is [place] and English.  There is no known Ashkenazi Quaker ancestry on either side of her family. There is no reported consanguinity.    Discussion:    Elizabeth's personal and family history of melanoma, breast, colon, kidney, stomach and pancreatic cancer is suggestive of a hereditary cancer syndrome.    We reviewed the features of sporadic, familial, and hereditary cancers. In looking at Elizabeth's family history, it is possible that a cancer susceptibility gene is present due to her and her siblings' melanoma, her cousin's melanoma, her sister and aunt's breast cancer , her  Mother's kidney cancer, her cousin's colon cancer, her other cousin's stomach cancer and her grandmother's pancreatic cancer.     We discussed that myeloma, lymphoma and lung cancer are  typically considered multifactorial in which a combination of genetic changes and the environment can influence the development of these types of cancers. Genetic testing for these types of cancer are not comprehensive but Nancy is encouraged to contact me in the future in the event that targeted genetic testing is developed for these types of cancers.   Due to the melanoma, breast and pancreatic cancer, we discussed the natural history and genetics of Hereditary Breast and Ovarian Cancer (HBOC), which is caused by a mutation in the BRCA1 or BRCA2 gene. HBOC typically presents with multiple family members diagnosed with breast cancer before age 50 and/or ovarian cancer. Other cancer risks associated with HBOC include male breast cancer, prostate cancer, pancreatic cancer, and melanoma.   Based on her personal and family history, Elizabeth meets current National Comprehensive Cancer Network (NCCN) criteria for genetic testing of BRCA1/2.    Because of the pancreatic and colon cancer on her maternal side and possibly the stomach cancer in her paternal side, we also discussed Rodney syndrome, which can be caused by a mutation in one of five genes:  MLH1, MSH2, MSH6, PMS2, and EPCAM. A single mutation in one of the Rodney Syndrome genes increases the risk for several cancers, such as colon, endometrial, ovarian, and stomach.  Other cancers that can be seen in some families with Rodney Syndrome include pancreatic, bladder, biliary tract, urothelial, small bowel, prostate, breast and brain cancers.  We discussed Familial Atypical Multiple Mole Melanoma (FAMMM) syndrome, which is caused by a mutation in one copy of the CDKN2A and CDK4 gene. It is characterized by multiple melanocytic nevi and a family history of melanoma. The likelihood of developing melanoma varies by geographic location, and has been estimated to be 50-75% in the [place]. CDKN2A are also associated with an increased risk for pancreatic cancer, among  others.  The likelihood of developing pancreatic cancer has been estimated to be approximately 17%; in some families, though, this risk may be higher.   PTEN hamartoma tumor syndrome (PHTS) is caused by mutations in one copy of the PTEN gene. It is characterized by increased risk for breast, thyroid (especially follicular type), endometrial and other cancers as well as macrocephaly and intellectual disability. Melanoma and other cutaneous features such as trichilemmoma, facial papules and palmoplantar keratoses can be seen as well.   We briefly discussed other genes that have associated risks for developing melanoma:   CDK4: Individuals with a mutation in one copy of their CDK4 gene have familial cutaneous malignant melanoma. They are at an increased risk for developing atypical moles and melanoma, though the exact lifetime risk has not yet been defined.   MITF: Individuals with the E318K mutation in the MITF gene have an elevated risk for melanoma. This risk has been estimated to be nearly 3 times higher than the general population risk. Other mutations in the MITF are not currently known to be associated with this increased risk.  RB1: Retinoblastoma is caused by mutations in one copy of the RB1 gene. It is characterized by a significantly increased risk for malignant tumors in the retina, typically found in young children. Melanomas, osteosarcomas, and soft tissues sarcoma can also be seen in affected individuals, though the exact lifetime risks are unknown.   TP53: Mutations in one copy of the TP53 gene cause Li-Fraumeni syndrome (LFS). LFS is associated with the development of many different types of cancer (i.e. sarcomas, adrenocortical tumors, breast cancer) beginning in childhood or young adulthood. Increased risk for melanoma has also been reported in families with LFS.     A detailed handout regarding HBOC, Rodney syndrome, melanoma-related syndromes and mutations, and the information we discussed was  provided to Elizabeth at the end of our appointment today and can be found in the after visit summary. Topics included: inheritance pattern, cancer risks, cancer screening recommendations, and also risks, benefits and limitations of testing.    We discussed that there are additional genes that could cause increased risk for melanoma, breast, colon, kidney, stomach and pancreatic cancer. As many of these genes present with overlapping features in a family and accurate cancer risk cannot always be established based upon the pedigree analysis alone, it would be reasonable for Elizabeth to consider panel genetic testing to analyze multiple genes at once.    We reviewed genetic testing options for the cancers seen in her family history that suggest a hereditary cause: an actionable high/moderate risk gene custom panel and an expanded high and moderate risk custom panel. Elizabeth expressed an interest in learning as much information as possible from the testing. She opted for the expanded panel.  Genetic testing is available for 67 genes associated with increased risk for many different cancers: CancerNext-Expanded (AIP, ALK, APC, BEVERLY, BAP1, BARD1, BLM, BRCA1, BRCA2, BRIP1, BMPR1A, CDH1, CDK4, CDKN1B, CDKN2A, CHEK2, DICER1, EPCAM, FANCC, FH, FLCN, GALNT12, GREM1, HOXB13, MAX, MEN1, MET, MITF, MLH1, MRE11A, MSH2, MSH6, MUTYH, NBN, NF1, NF2, PALB2, PHOX2B, PMS2, POLD1, POLE, POT1, FTDLR5C, PTCH1, PTEN, RAD50, RAD51C, RAD51D, RB1, RET, SDHA, SDHAF2, SDHB, SDHC, SDHD, SMAD4, SMARCA4, SMARCB1, SMARCE1, STK11, SUFU, [id number], TP53, TSC1, TSC2, VHL, and XRCC2).  We discussed that many of the genes in the CancerNext-Expanded panel are associated with specific hereditary cancer syndromes and have published management guidelines:  Hereditary Breast and Ovarian Cancer syndrome (BRCA1, BRCA2), Rodney syndrome (MLH1, MSH2, MSH6, PMS2, EPCAM), Familial Adenomatous Polyposis (APC), Hereditary Diffuse Gastric Cancer (CDH1), Familial Atypical Multiple  Mole Melanoma syndrome (CDK4, CDKN2A), Juvenile Polyposis syndrome (BMPR1A, SMAD4), Cowden syndrome (PTEN), Li Fraumeni syndrome (TP53), Peutz-Jeghers syndrome (STK11), MUTYH Associated Polyposis (MUTYH), Hereditary Leiomyomatosis and Renal Cell Cancer (FH), Wcal-Axik-Oghj (FLCN), Hereditary Papillary Renal Carcinoma (MET), Hereditary Paraganglioma and Pheochromocytoma syndrome (SDHA, SDHAF2, SDHB, SDHC, SDHD), Multiple Endocrine Neoplasia type 2 (RET), Tuberous sclerosis complex (TSC1, TSC2), Von Hippel-Lindau disease (VHL), Neurofibromatosis type 1 (NF1), Neurofibromatosis type 2 (NF1), Multiple Endocrine Neoplasia type 1 (MEN1), Multiple Endocrine Neoplasia type 4 (CDKN1B), Gorlin syndrome (SUFU, PTCH1), and Bailey complex ([id number]).  The BEVERLY, BRIP1, CHEK2, GREM1, NBN, PALB2, POLD1, POLE, RAD51C, and RAD51D genes are associated with increased cancer risk and have published management guidelines for certain cancers.    The remaining genes (AIP, ALK, BAP1, BARD1, BLM, DICER1, FANCC, GALNT12, HOXB13, MRE11A, MAX, MET, MITF, PHOX2B, POT1, RAD50, SMARCA4, SMARCB1, SMARCE1, [id number], and XRCC2) are associated with increased cancer risk and may allow us to make medical recommendations when mutations are identified.    Elizabeth was provided with a detailed brochure from [facility] explaining the CancerNext-Expanded testing.  Consent was obtained and genetic testing for CancerNext-Expanded was sent to [facility]. Turn around time: 4-6 weeks.     Medical Management: For  lEizabeth, we reviewed that the information from genetic testing may determine:    additional cancer screening for which Elizabeth may qualify (i.e., mammogram and breast MRI, more frequent colonoscopies, more frequent dermatologic exams, etc.),    options for risk reducing surgeries Elizabeth could consider (i.e., bilateral mastectomy, surgery to remove her ovaries and/or uterus, etc.),      and targeted chemotherapies for Elizabeth if she were to develop  certain cancers in the future (i.e. immunotherapy for individuals with Rodney syndrome, PARP inhibitors, etc.).     These recommendations and possible targeted chemotherapies will be discussed in detail once genetic testing is completed.   Plan:  1) Today Elizabeth elected to proceed with genetic testing using the CancerNext-Expanded panel offered by [facility].  2) This information should be available in 4-6 weeks.  3) Elizabeth will schedule a telephone call to discuss the results.    Face to face time: 45 minutes    Sydni Lerma MS, [facility]  Licensed genetic counselor  [phone]         Again, thank you for allowing me to participate in the care of your patient.        Sincerely,        PRADIP Lerma, GC

## 2019-12-31 ENCOUNTER — TELEPHONE (OUTPATIENT)
Dept: ONCOLOGY | Facility: CLINIC | Age: 59
End: 2019-12-31

## 2019-12-31 NOTE — TELEPHONE ENCOUNTER
----- Message from BRANDO Lerma GC sent at 12/30/2019  1:07 PM CST -----  Regarding: Please schedule a return visit  Hello, please schedule an in-person return visit for this patient any time after 1-.    I see patients on Monday, Thursday and Friday mornings.     Thank you!  Sydni

## 2020-01-03 LAB — LAB SCANNED RESULT: NORMAL

## 2020-01-17 NOTE — PROGRESS NOTES
1/20/2020    Referring Provider: Hannah Valles MD    Presenting Information:  I spoke to Nancy by phone today to discuss her genetic testing results. Her blood was drawn on 12/23/2019. CancerNext-Expanded test was ordered  from [facility]. This testing was done because of Nancy's personal history of melanoma and lymphoma as well as her family history of breast, stomach, colon, kidney, and pancreatic cancer.    Genetic Testing Result: NEGATIVE  Nancy is negative for mutations in the AIP, ALK, APC, BEVERLY, BAP1, BARD1, BLM, BMPR1A, BRCA1, BRCA2, BRIP1, CDH1, CDK4, CDKN1B, CDKN2A, CHEK2, DICER1, EPCAM, FANCC, FH, FLCN, GALNT12, GREM1, HOXB13, MAX, MEN1, MET, MITF, MLH1, MRE11A, MSH2, MSH6, MUTYH, NBN, NF1, NF2, PALB2, PHOX2B, PMS2, POLD1, POLE, POT1, UUTKI5K, PTCH1, PTEN, RAD50, RAD51C, RAD51D, RB1, RET, SDHA, SDHAF2, SDHB, SDHC, SDHD, SMAD4, SMARCA4, SMARCB1, SMARCE1, STK11, SUFU, [id number], TP53, TSC1, TSC2, VHL, and XRCC2 genes. No mutations were found in any of the 67 genes analyzed. This test involved sequencing and deletion/duplication analysis of all genes with the exceptions of EPCAM and GREM1 (deletions/duplications only) and MITF (only the status of the c.952G>A (p.E318K) alteration is analyzed and reported).    Testing did not detect an identifiable mutation associated with Hereditary Breast and Ovarian Cancer syndrome (BRCA1, BRCA2), Rodney syndrome (MLH1, MSH2, MSH6, PMS2, EPCAM), Familial Adenomatous Polyposis (APC), Hereditary Diffuse Gastric Cancer (CDH1), Familial Atypical Multiple Mole Melanoma syndrome (CDK4, CDKN2A), Juvenile Polyposis syndrome (BMPR1A, SMAD4), Cowden syndrome (PTEN), Li Fraumeni syndrome (TP53), Peutz-Jeghers syndrome (STK11), MUTYH Associated Polyposis (MUTYH),  Hereditary Leiomyomatosis and Renal Cell Cancer (FH), Ouzw-Jehq-Kxrm (FLCN), Hereditary Papillary Renal Carcinoma (MET), Hereditary Paraganglioma and Pheochromocytoma syndrome (SDHA, SDHAF2, SDHB, SDHC, SDHD), Multiple  "Endocrine Neoplasia type 2 (RET), Tuberous sclerosis complex (TSC1, TSC2), Von Hippel-Lindau disease (VHL), Neurofibromatosis type 1 (NF1), Neurofibromatosis type 2 (NF1), Multiple Endocrine Neoplasia type 1 (MEN1), Multiple Endocrine Neoplasia type 4 (CDKN1B), Gorlin syndrome (SUFU, PTCH1), or Bailey complex ([id number]).    A copy of the test report can be found in the Laboratory tab, dated 12/23/2019, and named \"[facility]\". The report is scanned in as a linked document.    Interpretation:  We discussed several different interpretations of this negative test result.    1. One explanation may be that there is a different gene or combination of genes and environment that are associated with the cancers in this family.  2. It is possible that her siblings and/or other relatives on both sides of the family did have a mutation in a cancer susceptibility gene and she did not inherit it.  3. There is also a small possibility that there is a mutation in one of these genes, and the testing laboratory could not find it with their current testing methods.       Screening:  Based on this negative test result, it is important for Nancy and her relatives to refer back to the family history for appropriate cancer screening.      Due to the close family history of melanoma, Nancy's children remain at some increased risk for developing melanoma. According to the American Cancer Society, first-degree relatives like her children and her siblings are encouraged to speak to her primary care provider about having regular skin exams and safe skin practices (i.e. sunscreen, self skin exams, limited sun exposure, etc.).    Based on her personal and family history, Nancy has a  23.3% lifetime risk of developing breast cancer based on the FENG 8 model. As such, Nancy meets current National Comprehensive Cancer Network (NCCN) guidelines for high risk breast screening. This includes annual breast MRI in addition to annual mammogram " at her current age.  In addition, Nancy should be receiving clinical breast exams by her physician.     We discussed that Nancy could participate in our Cancer Risk Management Program in which our clinical nurse specialist provides an individual screening plan and assists with medical management. Elizabeth said she would likely continue mammograms at this point and was not interested in adding a breast MRI at this time. I recommended she talk with her OB/GYN or other provider about the recommendation to add a breast MRI. At this time a referral was not made to see GLORIA Arthur, but I encouraged Elizabeth to call me if she changes her mind.     Nancy s close female relatives remain at increased risk for breast cancer given their family history. Breast cancer screening is generally recommended to begin approximately 10 years younger than the earliest age of breast cancer diagnosis in the family, or at age 40, whichever comes first. In this family, screening may begin at age 40. Breast screening options should be discussed with an individual's primary care provider and a genetic counselor, to determine at what age to begin screening, what screening is appropriate, and if additional screening (such as breast MRI) is necessary based on personal/family history factors.     Other population cancer screening options, such as those recommended by the American Cancer Society and the National Comprehensive Cancer Network (NCCN), are also appropriate for Nancy and her family. These screening recommendations may change if there are changes to Nancy's personal and/or family history of cancer. Final screening recommendations should be made by each individual's managing physician.    Inheritance:  We reviewed the autosomal dominant inheritance of 67 genes.  We discussed that Nancy cannot/did not pass on an identifiable mutation in these genes to her children based on this test result.  Mutations in these genes do not skip  generations.      Additional Testing Considerations:  Although Nancy's genetic testing result was negative, other relatives may still carry a gene mutation associated with breast, pancreatic, colon, and stomach cancer as well as melanoma. Genetic counseling is recommended for her sister who had breast cancer and melanoma, her brother who had melanoma, and other relatives who have had cancer to discuss genetic testing options. If any of these relatives do pursue genetic testing, Nancy is encouraged to contact me so that we may review the impact of their test results on her.    Summary:  We do not have an explanation for Nancy's personal history of melanoma or her family history of cancer. While no genetic changes were identified, Nancy may still be at risk for certain cancers due to family history, environmental factors, or other genetic causes not identified by this test.  Because of that, it is important that she continue with cancer screening based on her personal and family history as discussed above.    Genetic testing is rapidly advancing, and new cancer susceptibility genes will most likely be identified in the future.  Therefore, I encouraged Nancy to contact me annually or if there are changes in her personal or family history.  This may change how we assess her cancer risk, screening, and the testing we would offer.    Plan:  1. A copy of the test results will be mailed to Nancy.  2. She plans to follow-up with her medical providers.  3. She should contact me annually, or sooner if her family history changes.    If Nancy has any further questions, I encouraged her to contact me at [phone].    Time spent on the phone: 8 minutes.    Sydni Lerma MS, [facility]  Licensed genetic counselor  [phone]

## 2020-01-20 ENCOUNTER — VIRTUAL VISIT (OUTPATIENT)
Dept: ONCOLOGY | Facility: CLINIC | Age: 60
End: 2020-01-20
Attending: FAMILY MEDICINE
Payer: COMMERCIAL

## 2020-01-20 DIAGNOSIS — Z80.8 FAMILY HISTORY OF MELANOMA: ICD-10-CM

## 2020-01-20 DIAGNOSIS — C43.61 MALIGNANT MELANOMA OF SKIN OF UPPER LIMB, INCLUDING SHOULDER, RIGHT (H): Primary | ICD-10-CM

## 2020-01-20 DIAGNOSIS — Z80.0 FAMILY HISTORY OF PANCREATIC CANCER: ICD-10-CM

## 2020-01-20 DIAGNOSIS — Z80.0 FAMILY HISTORY OF STOMACH CANCER: ICD-10-CM

## 2020-01-20 NOTE — LETTER
Cancer Risk Management  Program [facility]  [facility]  [facility]  [facility]  Mailing Address  Cancer Risk Management Program  [facility]  [address]  [place] [address]    New patient appointments  [phone]  January 22, 2020    Nancy Catalan  [address] [address]      Dear Nancy,    It was a pleasure speaking with you on the phone on 1/20/2020.  Here is a copy of the progress note from our discussion.  If you have any additional questions, please feel free to call.    Referring Provider: Hannah Valles MD    Presenting Information:  I spoke to Nancy by phone today to discuss her genetic testing results. Her blood was drawn on 12/23/2019. CancerNext-Expanded test was ordered  from [facility] ([place]). This testing was done because of Nancy's personal history of melanoma and lymphoma as well as her family history of breast, stomach, colon, kidney, and pancreatic cancer.    Genetic Testing Result: NEGATIVE  Nancy is negative for mutations in the AIP, ALK, APC, BEVERLY, BAP1, BARD1, BLM, BMPR1A, BRCA1, BRCA2, BRIP1, CDH1, CDK4, CDKN1B, CDKN2A, CHEK2, DICER1, EPCAM, FANCC, FH, FLCN, GALNT12, GREM1, HOXB13, MAX, MEN1, MET, MITF, MLH1, MRE11A, MSH2, MSH6, MUTYH, NBN, NF1, NF2, PALB2, PHOX2B, PMS2, POLD1, POLE, POT1, DXBLC1U, PTCH1, PTEN, RAD50, RAD51C, RAD51D, RB1, RET, SDHA, SDHAF2, SDHB, SDHC, SDHD, SMAD4, SMARCA4, SMARCB1, SMARCE1, STK11, SUFU, DGSU535, TP53, TSC1, TSC2, VHL, and XRCC2 genes. No mutations were found in any of the 67 genes analyzed. This test involved sequencing and deletion/duplication analysis of all genes with the exceptions of EPCAM and GREM1 (deletions/duplications only) and MITF (only the status of the c.952G>A (p.E318K) alteration is analyzed and reported).    Testing did not  detect an identifiable mutation associated with Hereditary Breast and Ovarian Cancer syndrome (BRCA1, BRCA2), Rodney syndrome (MLH1, MSH2, MSH6, PMS2, EPCAM), Familial Adenomatous Polyposis (APC), Hereditary Diffuse Gastric Cancer (CDH1), Familial Atypical Multiple Mole Melanoma syndrome (CDK4, CDKN2A), Juvenile Polyposis syndrome (BMPR1A, SMAD4), Cowden syndrome (PTEN), Li Fraumeni syndrome (TP53), Peutz-Jeghers syndrome (STK11), MUTYH Associated Polyposis (MUTYH),  Hereditary Leiomyomatosis and Renal Cell Cancer (FH), Xmnr-Guom-Mhmv (FLCN), Hereditary Papillary Renal Carcinoma (MET), Hereditary Paraganglioma and Pheochromocytoma syndrome (SDHA, SDHAF2, SDHB, SDHC, SDHD), Multiple Endocrine Neoplasia type 2 (RET), Tuberous sclerosis complex (TSC1, TSC2), Von Hippel-Lindau disease (VHL), Neurofibromatosis type 1 (NF1), Neurofibromatosis type 2 (NF1), Multiple Endocrine Neoplasia type 1 (MEN1), Multiple Endocrine Neoplasia type 4 (CDKN1B), Gorlin syndrome (SUFU, PTCH1), or Bailey complex ([id number]).    Interpretation:  We discussed several different interpretations of this negative test result.    1. One explanation may be that there is a different gene or combination of genes and environment that are associated with the cancers in this family.  2. It is possible that her siblings and/or other relatives on both sides of the family did have a mutation in a cancer susceptibility gene and she did not inherit it.  3. There is also a small possibility that there is a mutation in one of these genes, and the testing laboratory could not find it with their current testing methods.       Screening:  Based on this negative test result, it is important for Nancy and her relatives to refer back to the family history for appropriate cancer screening.      Due to the close family history of melanoma, Nancy's children remain at some increased risk for developing melanoma. According to the American Cancer Society, first-degree  relatives like her children and her siblings are encouraged to speak to her primary care provider about having regular skin exams and safe skin practices (i.e. sunscreen, self skin exams, limited sun exposure, etc.).    Based on her personal and family history, Nancy has a  23.3% lifetime risk of developing breast cancer based on the FENG 8 model. As such, Nancy meets current National Comprehensive Cancer Network (NCCN) guidelines for high risk breast screening. This includes annual breast MRI in addition to annual mammogram at her current age.  In addition, Nancy should be receiving clinical breast exams by her physician.     We discussed that Nancy could participate in our Cancer Risk Management Program in which our clinical nurse specialist provides an individual screening plan and assists with medical management. Elizabeth said she would likely continue mammograms at this point and was not interested in adding a breast MRI at this time. I recommended she talk with her OB/GYN or other provider about the recommendation to add a breast MRI. At this time a referral was not made to see GLORIA Arthur, but I encouraged Elizabeth to call me if she changes her mind.     Nancy s close female relatives remain at increased risk for breast cancer given their family history. Breast cancer screening is generally recommended to begin approximately 10 years younger than the earliest age of breast cancer diagnosis in the family, or at age 40, whichever comes first. In this family, screening may begin at age 40. Breast screening options should be discussed with an individual's primary care provider and a genetic counselor, to determine at what age to begin screening, what screening is appropriate, and if additional screening (such as breast MRI) is necessary based on personal/family history factors.     Other population cancer screening options, such as those recommended by the American Cancer Society and the National  Comprehensive Cancer Network (NCCN), are also appropriate for Nancy and her family. These screening recommendations may change if there are changes to Nacny's personal and/or family history of cancer. Final screening recommendations should be made by each individual's managing physician.    Inheritance:  We reviewed the autosomal dominant inheritance of 67 genes.  We discussed that Nancy cannot/did not pass on an identifiable mutation in these genes to her children based on this test result.  Mutations in these genes do not skip generations.      Additional Testing Considerations:  Although Nancy's genetic testing result was negative, other relatives may still carry a gene mutation associated with breast, pancreatic, colon, and stomach cancer as well as melanoma. Genetic counseling is recommended for her sister who had breast cancer and melanoma, her brother who had melanoma, and other relatives who have had cancer to discuss genetic testing options. If any of these relatives do pursue genetic testing, Nancy is encouraged to contact me so that we may review the impact of their test results on her.    Summary:  We do not have an explanation for Nancy's personal history of melanoma or her family history of cancer. While no genetic changes were identified, Nancy may still be at risk for certain cancers due to family history, environmental factors, or other genetic causes not identified by this test.  Because of that, it is important that she continue with cancer screening based on her personal and family history as discussed above.    Genetic testing is rapidly advancing, and new cancer susceptibility genes will most likely be identified in the future.  Therefore, I encouraged Nancy to contact me annually or if there are changes in her personal or family history.  This may change how we assess her cancer risk, screening, and the testing we would offer.    Plan:  1. A copy of the test results will be mailed to  Nancy.  2. She plans to follow-up with her medical providers.  3. She should contact me annually, or sooner if her family history changes.    If Nancy has any further questions, I encouraged her to contact me at [phone].    Sydni Lerma MS, [facility]  Licensed genetic counselor  [phone]

## 2020-01-20 NOTE — Clinical Note
Please send a copy of this letter to the patient and include a copy of their genetic testing results: Send outs misc test [[id number]] (Order [id number]) .     Thank you,  Sydni Lerma MS, [facility]  Licensed genetic counselor

## 2020-05-04 ENCOUNTER — TELEPHONE (OUTPATIENT)
Dept: DERMATOLOGY | Facility: CLINIC | Age: 60
End: 2020-05-04

## 2020-05-04 NOTE — TELEPHONE ENCOUNTER
Patient returned clinics call and was informed due to COVID 19 her appt on 5-26 would need to be changed to telephone with photos of areas of concern. Patient states she is in [place] and will follow up there.     Nasra Raza LPN

## 2020-12-20 ENCOUNTER — HEALTH MAINTENANCE LETTER (OUTPATIENT)
Age: 60
End: 2020-12-20

## 2021-01-15 ENCOUNTER — HEALTH MAINTENANCE LETTER (OUTPATIENT)
Age: 61
End: 2021-01-15

## 2021-10-03 ENCOUNTER — HEALTH MAINTENANCE LETTER (OUTPATIENT)
Age: 61
End: 2021-10-03

## 2022-01-23 ENCOUNTER — HEALTH MAINTENANCE LETTER (OUTPATIENT)
Age: 62
End: 2022-01-23

## 2022-03-20 ENCOUNTER — HEALTH MAINTENANCE LETTER (OUTPATIENT)
Age: 62
End: 2022-03-20

## 2022-09-10 ENCOUNTER — HEALTH MAINTENANCE LETTER (OUTPATIENT)
Age: 62
End: 2022-09-10

## 2023-04-30 ENCOUNTER — HEALTH MAINTENANCE LETTER (OUTPATIENT)
Age: 63
End: 2023-04-30